# Patient Record
Sex: MALE | Race: WHITE | NOT HISPANIC OR LATINO | Employment: UNEMPLOYED | URBAN - NONMETROPOLITAN AREA
[De-identification: names, ages, dates, MRNs, and addresses within clinical notes are randomized per-mention and may not be internally consistent; named-entity substitution may affect disease eponyms.]

---

## 2024-10-24 ENCOUNTER — OFFICE VISIT (OUTPATIENT)
Dept: FAMILY MEDICINE CLINIC | Facility: CLINIC | Age: 79
End: 2024-10-24
Payer: MEDICARE

## 2024-10-24 VITALS
TEMPERATURE: 97.6 F | HEIGHT: 67 IN | OXYGEN SATURATION: 99 % | HEART RATE: 105 BPM | DIASTOLIC BLOOD PRESSURE: 70 MMHG | WEIGHT: 151 LBS | RESPIRATION RATE: 14 BRPM | BODY MASS INDEX: 23.7 KG/M2 | SYSTOLIC BLOOD PRESSURE: 130 MMHG

## 2024-10-24 DIAGNOSIS — E03.9 HYPOTHYROIDISM, UNSPECIFIED TYPE: Chronic | ICD-10-CM

## 2024-10-24 DIAGNOSIS — R07.9 CHEST PAIN, UNSPECIFIED TYPE: Primary | ICD-10-CM

## 2024-10-24 DIAGNOSIS — E55.9 VITAMIN D DEFICIENCY: ICD-10-CM

## 2024-10-24 DIAGNOSIS — I63.50 CEREBRAL INFARCTION DUE TO CEREBRAL ARTERY OCCLUSION: Chronic | ICD-10-CM

## 2024-10-24 DIAGNOSIS — I48.0 PAROXYSMAL ATRIAL FIBRILLATION: Chronic | ICD-10-CM

## 2024-10-24 DIAGNOSIS — Z95.0 PACEMAKER: Chronic | ICD-10-CM

## 2024-10-24 DIAGNOSIS — R73.03 PREDIABETES: Chronic | ICD-10-CM

## 2024-10-24 DIAGNOSIS — Z23 ENCOUNTER FOR IMMUNIZATION: ICD-10-CM

## 2024-10-24 DIAGNOSIS — E78.5 HYPERLIPIDEMIA, UNSPECIFIED HYPERLIPIDEMIA TYPE: Chronic | ICD-10-CM

## 2024-10-24 DIAGNOSIS — M79.671 RIGHT FOOT PAIN: Chronic | ICD-10-CM

## 2024-10-24 PROCEDURE — G0008 ADMIN INFLUENZA VIRUS VAC: HCPCS | Performed by: NURSE PRACTITIONER

## 2024-10-24 PROCEDURE — 1159F MED LIST DOCD IN RCRD: CPT | Performed by: NURSE PRACTITIONER

## 2024-10-24 PROCEDURE — 99204 OFFICE O/P NEW MOD 45 MIN: CPT | Performed by: NURSE PRACTITIONER

## 2024-10-24 PROCEDURE — 90662 IIV NO PRSV INCREASED AG IM: CPT | Performed by: NURSE PRACTITIONER

## 2024-10-24 PROCEDURE — 1160F RVW MEDS BY RX/DR IN RCRD: CPT | Performed by: NURSE PRACTITIONER

## 2024-10-24 RX ORDER — LEVOTHYROXINE SODIUM 50 UG/1
50 TABLET ORAL EVERY MORNING
COMMUNITY
Start: 2024-08-27

## 2024-10-24 RX ORDER — ATORVASTATIN CALCIUM 40 MG/1
40 TABLET, FILM COATED ORAL EVERY MORNING
COMMUNITY

## 2024-10-24 RX ORDER — NITROGLYCERIN 0.3 MG/1
0.3 TABLET SUBLINGUAL
Qty: 20 TABLET | Refills: 6 | Status: SHIPPED | OUTPATIENT
Start: 2024-10-24

## 2024-10-24 RX ORDER — ERGOCALCIFEROL 1.25 MG/1
1 CAPSULE, LIQUID FILLED ORAL WEEKLY
COMMUNITY
Start: 2024-08-28

## 2024-10-24 RX ORDER — SENNOSIDES 8.6 MG
1300 CAPSULE ORAL EVERY 8 HOURS PRN
COMMUNITY
Start: 2024-08-28

## 2024-10-24 RX ORDER — PANTOPRAZOLE SODIUM 40 MG/1
40 TABLET, DELAYED RELEASE ORAL EVERY MORNING
COMMUNITY

## 2024-10-24 RX ORDER — CLOPIDOGREL BISULFATE 75 MG/1
75 TABLET ORAL EVERY MORNING
COMMUNITY

## 2024-10-24 NOTE — PROGRESS NOTES
"      History of Present Illness      Twan Mcwilliams is a 79 y.o. male who presents to [facility] pertaining to his [facility] ED visit on October 23rd 2024 for chest pain.  His wife is with him today who assists with his history.  Jaime had a CVA years ago which has left him with expressive aphasia and right-sided weakness.    Angi, Jaime's wife, reports yesterday at approximately 3:00 Jaime was complaining of left chest pain.  The pain was located where his first pacemaker was located.  The pain did not radiate, he had no nausea,vomiting, or sweating.  She did drive him to the [facility] ED which is within minutes of their home and there he was evaluated.  His heart score results was a total of 5 with which was moderate. Trop: was less than 0.300.  Chest x-ray was unremarkable.The ED provider advised him to be hospitalized but Jaime adamantly refused.  Since his discharge from the emergency department, Jaime has had no further symptoms and is in his normal state of health at this time.    Jaime has been diagnosed with HTN, PVD s/p right carotid stent in 2022, paroxysmal atrial fibrillation, GI bleeding, s/p pacemaker .He had an ischemic stroke in 2013, resulting in right side deficit. 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 He underwent right carotid stenting in 2022, which was complicated by GI bleeding. Jaime was on warfarin before this, but it was switched to asa and plavix. Since then, he has been off AC and has been on asa and plavix. No further bleeding issues. He can walk with a cane.   He was seen by Dr. Rodgers, Cardiologist,  for consideration of watchman but decided to not pursue. He is \"scared\" to have any surgeries.     The following portions of the patient's history were reviewed and updated as appropriate: allergies, current medications, past family history, past medical history, past social history, past surgical history and problem list.    Review of " "Systems   Constitutional:  Negative for activity change, appetite change, chills, fatigue and fever.   HENT:  Negative for congestion and sore throat.    Eyes:  Negative for pain, discharge, redness and itching.   Respiratory:  Negative for cough, chest tightness, shortness of breath and wheezing.    Cardiovascular:  Negative for chest pain (Resolved) and palpitations.   Gastrointestinal:  Negative for nausea and vomiting.   Musculoskeletal:  Positive for arthralgias and gait problem.   Skin:  Negative for color change and rash.   Neurological:  Negative for dizziness, syncope, light-headedness and headaches.   Hematological:  Negative for adenopathy.   Psychiatric/Behavioral:  Negative for confusion. The patient is not nervous/anxious.      Vital signs:  /70 (BP Location: Right arm, Patient Position: Sitting, Cuff Size: Adult)   Pulse 105   Temp 97.6 °F (36.4 °C) (Temporal)   Resp 14   Ht 170.2 cm (67\")   Wt 68.5 kg (151 lb)   SpO2 99%   BMI 23.65 kg/m²     Physical Exam  Vitals and nursing note reviewed.   Constitutional:       General: He is not in acute distress.     Appearance: He is well-developed.   HENT:      Head: Normocephalic.      Nose: Nose normal.      Mouth/Throat:      Pharynx: No oropharyngeal exudate.   Eyes:      General:         Right eye: No discharge.         Left eye: No discharge.      Conjunctiva/sclera: Conjunctivae normal.   Neck:      Trachea: No tracheal tenderness.   Cardiovascular:      Rate and Rhythm: Normal rate and regular rhythm.      Heart sounds: Normal heart sounds. No murmur heard.     No friction rub.   Pulmonary:      Effort: Pulmonary effort is normal. No respiratory distress.      Breath sounds: Normal breath sounds. No wheezing or rales.   Musculoskeletal:         General: No tenderness.      Cervical back: Neck supple.      Right foot: Decreased range of motion. Foot drop present.   Feet:      Right foot:      Skin integrity: No ulcer, blister, skin " breakdown, erythema, warmth or fissure.      Toenail Condition: Right toenails are normal.   Skin:     General: Skin is warm and dry.      Capillary Refill: Capillary refill takes less than 2 seconds.      Findings: No erythema or rash.   Neurological:      Mental Status: He is alert and oriented to person, place, and time.      Cranial Nerves: Cranial nerve deficit present.      Motor: Weakness present.      Gait: Gait abnormal.      Comments: Right-sided weakness secondary to CVA.  He is having difficulty with his right foot which he does have a brace but is not helping in fact he reports it is hurting his foot.  Referral to podiatry placed   Psychiatric:         Mood and Affect: Mood and affect normal.         Behavior: Behavior is cooperative.         Thought Content: Thought content normal.      Comments: Communicates with difficulty.  Uses expressions and body language.     Result Review : [facility] ED records from 10/23/2024 reviewed and scanned into the chart.  Have requested his medical records from his previous PCP    BMI is within normal parameters. No other follow-up for BMI required.    Assessment & Plan     Diagnoses and all orders for this visit:    1. Chest pain, unspecified type (Primary)  Comments:  Resolved.  Prescription for nitroglycerin with instructions provided.  Referral to cardiology for further evaluation  Orders:  -     nitroglycerin (Nitrostat) 0.3 MG SL tablet; Place 1 tablet under the tongue Every 5 (Five) Minutes As Needed for Chest Pain. Take no more than 3 doses in 15 minutes.  Dispense: 20 tablet; Refill: 6  -     Ambulatory Referral to Cardiology  -     CBC & Differential; Future  -     Magnesium; Future    2. Cerebral infarction due to cerebral artery occlusion  Comments:  Stable.  Does have right-sided weakness of upper and lower extremities.  Expressive aphasia.  Orders:  -     Ambulatory Referral to Podiatry  -     CBC & Differential; Future  -     Magnesium;  Future    3. Paroxysmal atrial fibrillation  Comments:  Continue clopidogrel and aspirin  Orders:  -     CBC & Differential; Future  -     Magnesium; Future    4. Pacemaker  Comments:  Last interrogation per EP cardiologist was June 2024 and was told he would need a new battery in approximately 1 year  Orders:  -     Ambulatory Referral to Cardiology    5. Hyperlipidemia, unspecified hyperlipidemia type  Comments:  Continue atorvastatin  Orders:  -     Comprehensive Metabolic Panel; Future  -     Lipid Panel; Future    6. Hypothyroidism, unspecified type  Comments:  Continue levothyroxine  Orders:  -     TSH; Future  -     T4, Free; Future    7. Prediabetes  Comments:  Will order labs to assess  Orders:  -     Comprehensive Metabolic Panel; Future  -     Lipid Panel; Future  -     Hemoglobin A1c; Future  -     Vitamin B12; Future    8. Right foot pain  Comments:  Referral to podiatry for evaluation.  Orders:  -     Ambulatory Referral to Podiatry    9. Encounter for immunization  Comments:  Influenza vaccination given today  Orders:  -     Fluzone High-Dose 65+yrs ([id number])    10. Vitamin D deficiency  -     Vitamin D,25-Hydroxy; Future      Follow Up In November  Findings and recommendations discussed with Twan. Reviewed [facility] ED records and results. Lifestyle modifications reinforced including nutrition and activity recommendations.  Jaime will follow up in November sooner if problems/concerns occur.  Twan was given instructions and counseling regarding his condition or for health maintenance advice. Please see specific information pulled into the AVS if appropriate.    This document has been electronically signed by:

## 2024-10-26 PROBLEM — K21.9 GASTROESOPHAGEAL REFLUX DISEASE WITHOUT ESOPHAGITIS: Status: ACTIVE | Noted: 2024-10-26

## 2024-10-26 PROBLEM — T82.898A CAROTID STENT OCCLUSION: Status: RESOLVED | Noted: 2024-10-26 | Resolved: 2024-10-26

## 2024-10-26 PROBLEM — I77.9 BILATERAL CAROTID ARTERY DISEASE: Status: ACTIVE | Noted: 2024-10-26

## 2024-10-26 PROBLEM — R73.03 PREDIABETES: Status: ACTIVE | Noted: 2024-10-26

## 2024-10-26 PROBLEM — T82.898A CAROTID STENT OCCLUSION: Status: ACTIVE | Noted: 2024-10-26

## 2024-10-26 PROBLEM — E03.9 HYPOTHYROIDISM: Status: ACTIVE | Noted: 2024-10-26

## 2024-10-26 PROBLEM — I42.9 SECONDARY CARDIOMYOPATHY: Status: ACTIVE | Noted: 2024-10-26

## 2024-10-26 PROBLEM — I47.19 ATRIAL TACHYCARDIA: Status: ACTIVE | Noted: 2024-10-26

## 2024-10-26 PROBLEM — I48.0 PAROXYSMAL ATRIAL FIBRILLATION: Status: ACTIVE | Noted: 2024-10-26

## 2024-10-26 PROBLEM — E55.9 VITAMIN D DEFICIENCY: Status: ACTIVE | Noted: 2024-10-26

## 2024-10-26 PROBLEM — Z95.828 INTERNAL CAROTID ARTERY STENT PRESENT: Status: ACTIVE | Noted: 2024-10-26

## 2024-11-01 ENCOUNTER — OFFICE VISIT (OUTPATIENT)
Dept: CARDIOLOGY | Facility: CLINIC | Age: 79
End: 2024-11-01
Payer: MEDICARE

## 2024-11-01 VITALS
BODY MASS INDEX: 23.65 KG/M2 | HEART RATE: 65 BPM | HEIGHT: 67 IN | DIASTOLIC BLOOD PRESSURE: 68 MMHG | OXYGEN SATURATION: 97 % | RESPIRATION RATE: 16 BRPM | SYSTOLIC BLOOD PRESSURE: 122 MMHG

## 2024-11-01 DIAGNOSIS — R07.89 CHEST PAIN, ATYPICAL: ICD-10-CM

## 2024-11-01 DIAGNOSIS — I77.9 BILATERAL CAROTID ARTERY DISEASE, UNSPECIFIED TYPE: ICD-10-CM

## 2024-11-01 DIAGNOSIS — I48.0 PAROXYSMAL ATRIAL FIBRILLATION: Primary | ICD-10-CM

## 2024-11-01 DIAGNOSIS — Z95.0 PACEMAKER: ICD-10-CM

## 2024-11-01 DIAGNOSIS — E78.5 HYPERLIPIDEMIA, UNSPECIFIED HYPERLIPIDEMIA TYPE: ICD-10-CM

## 2024-11-01 RX ORDER — ASPIRIN 81 MG/1
81 TABLET ORAL DAILY
COMMUNITY

## 2024-11-01 RX ORDER — AMLODIPINE BESYLATE 2.5 MG/1
2.5 TABLET ORAL DAILY
Qty: 90 TABLET | Refills: 3 | Status: SHIPPED | OUTPATIENT
Start: 2024-11-01

## 2024-11-01 NOTE — PROGRESS NOTES
Lesli Blanca, KERRIE  No ref. provider found    Twan Mcwilliams  1945 11/01/2024    Subjective     Twan Mcwilliams is a 79 y.o. male who presents today to [facility] for Establish Care (Transfer from Dr. Blakely), Med Management (List from phone), Coronary Artery Disease (MI hx), and Palpitations (PPI 03/2017, [employer] device).    History of Present Illness:  81-year-old male with history of paroxysmal atrial fibrillation not on anticoagulation due to history of GI bleeding, status post pacemaker, peripheral vascular disease with right carotid stenting in 2022, comes in today to establish care.    As per chart review patient had an ischemic stroke in 2013 leading to a right side defect.  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  He underwent stenting of the right carotid artery in 2022 which was complicated by GI bleeding leading to a prolonged hospitalization of around 65 days.  Patient was previously on warfarin but was switched to aspirin and Plavix given the gastrointestinal bleeding.  He has not had any further bleeding.  Given this episode of bleeding he was found not to be a candidate for systemic anticoagulation and was offered Watchman device.  But patient does not want to pursue any invasive procedures at this point and just wants medical management.  He is pacemaker dependent and when he got his device checked on June 13, 2024 there was 1 year battery left.     Patient reports recently about 1 week ago he had an episode of chest pain for which he went to the emergency room room in [place].  Per report all the blood work and the imaging results were unremarkable patient was offered to stay overnight for monitoring but he wanted to be discharged.  Ambulates with a cane and is able to walk about 100 feet there is no new worsening exertional chest pain or shortness of breath. .  Just had this 1 episode while he was  sitting in his chair about 1 week ago for which she went to the ED.  Denies any episodes of significant palpitations dizziness lightheadedness or episodes of syncope.      No Known Allergies:    Current Outpatient Medications:     acetaminophen (TYLENOL) 650 MG 8 hr tablet, Take 2 tablets by mouth Every 8 (Eight) Hours As Needed., Disp: , Rfl:     aspirin 81 MG EC tablet, Take 1 tablet by mouth Daily., Disp: , Rfl:     atorvastatin (LIPITOR) 40 MG tablet, Take 1 tablet by mouth Every Morning., Disp: , Rfl:     clopidogrel (PLAVIX) 75 MG tablet, Take 1 tablet by mouth Every Morning., Disp: , Rfl:     levothyroxine (SYNTHROID, LEVOTHROID) 50 MCG tablet, Take 1 tablet by mouth Every Morning., Disp: , Rfl:     nitroglycerin (Nitrostat) 0.3 MG SL tablet, Place 1 tablet under the tongue Every 5 (Five) Minutes As Needed for Chest Pain. Take no more than 3 doses in 15 minutes., Disp: 20 tablet, Rfl: 6    pantoprazole (PROTONIX) 40 MG EC tablet, Take 1 tablet by mouth Every Morning., Disp: , Rfl:     vitamin D (ERGOCALCIFEROL) 1.25 MG (69149 UT) capsule capsule, Take 1 capsule by mouth 1 (One) Time Per Week., Disp: , Rfl:     amLODIPine (NORVASC) 2.5 MG tablet, Take 1 tablet by mouth Daily., Disp: 90 tablet, Rfl: 3    Past Medical History:   Diagnosis Date    Coronary artery disease     H/O: GI bleed     History of heart artery stent     Hyperlipidemia     Myocardial infarction     Pacemaker     Stroke      Past Surgical History:   Procedure Laterality Date    CORONARY STENT PLACEMENT      PACEMAKER IMPLANTATION       History reviewed. No pertinent family history.  Social History     Tobacco Use    Smoking status: Former     Current packs/day: 0.00     Average packs/day: 1 pack/day for 52.0 years (52.0 ttl pk-yrs)     Types: Cigarettes     Start date:      Quit date: 2013     Years since quittin.8     Passive exposure: Never    Smokeless tobacco: Never   Vaping Use    Vaping status: Never Used   Substance Use Topics  "   Alcohol use: Never    Drug use: Never       Objective   Blood pressure 122/68, pulse 65, resp. rate 16, height 170.2 cm (67\"), SpO2 97%.  Body mass index is 23.65 kg/m².    Constitutional:       Appearance: Not in distress.   Pulmonary:      Breath sounds: Normal breath sounds.   Cardiovascular:      Normal rate. Regular rhythm. Normal S1. Normal S2.       Murmurs: There is no murmur.   Edema:     Peripheral edema absent.   Skin:     General: Skin is warm.           DATA:           No results found for: \"BNP\"  No results found for: \"PSA\"   No results found for: \"MG\"  No results found for: \"INR\"  No results found for: \"CKTOTAL\"  No results found for: \"CHOL\", \"CHLPL\"  No results found for: \"TRIG\"  No results found for: \"HDL\"  No results found for: \"LDL\", \"LDLDIRECT\"  No components found for: \"A1C\"      No results found for: \"TSH\"          Invalid input(s): \"LABALBU\", \"PROT\"        Results review: During today's encounter, all relevant clinical data has been reviewed.        ECG 12 Lead    Date/Time: 2024 12:40 PM  Performed by: Mariana Garcia MD    Authorized by: Mariana Garcia MD  Rhythm: paced  QRS axis: normal  Other findings: non-specific ST-T wave changes  Pacin% capture  Clinical impression: abnormal EKG          Assessment & Plan    Diagnosis Plan   1. Paroxysmal atrial fibrillation        2. Pacemaker        3. Hyperlipidemia, unspecified hyperlipidemia type        4. Bilateral carotid artery disease, unspecified type        5. Chest pain, atypical  Adult Transthoracic Echo Complete W/ Cont if Necessary Per Protocol    amLODIPine (NORVASC) 2.5 MG tablet          Recommendations  Orders Placed This Encounter   Procedures    Adult Transthoracic Echo Complete W/ Cont if Necessary Per Protocol        For his history of atrial fibrillation with [id number] score of 6, age, hypertension, peripheral vascular disease and prior stroke.  Patient is currently only on DAPT with aspirin and Plavix.  " Patient and wife understand that DAPT do not negate the risk for stroke in setting of atrial fibrillation.  He has been previously deemed at high risk of bleeding so is not a candidate for anticoagulation.  Patient was again offered left atrial appendage occluder device like Watchman but him and his wife declined pursuing any procedures at this time.   For his history of carotid artery stenosis, we will continue him on DAPT and Lipitor.  Goal of LDL for him is less than 55.  He recently saw his PCP and his labs were ordered.   For his history of potential sick sinus syndrome, he has a permanent pacemaker.  ECG in today's visit showing a paced rhythm.  No evidence of under sensing or pacemaker malfunction based on the twelve-lead EKG. will schedule patient for a pacemaker interrogation as well.  As per the note from the previous cardiologist, he had a battery life of about 1 year left until June 2025.   Given his episode of chest pain and history of significant peripheral vascular disease and stroke.  Patient is at very high risk of having obstructive coronary artery disease.  Offered the patient and wife ischemic evaluation with a stress test but the do not want to get any procedures done at this time.  They are agreeable to get an echocardiogram.  Will get an echocardiogram at this time for further evaluation of the episode of chest pain.  Will continue medical management of stable coronary artery disease at this time.         New Medications:   New Medications Ordered This Visit   Medications    amLODIPine (NORVASC) 2.5 MG tablet     Sig: Take 1 tablet by mouth Daily.     Dispense:  90 tablet     Refill:  3       Discontinued Medications:   There are no discontinued medications.     Return in about 6 weeks (around 12/13/2024).      Thank you for allowing me to participate in the care of Twan Mcwilliams. Feel free to contact me directly with any further questions or concerns.      This document has been  electronically signed by Mariana Garcia MD   November 1, 2024 11:30 EDT    Dictated Utilizing Dragon Dictation: Part of this note may be an electronic transcription/translation of spoken language to printed text using the Dragon Dictation System.

## 2024-11-05 ENCOUNTER — LAB (OUTPATIENT)
Dept: FAMILY MEDICINE CLINIC | Facility: CLINIC | Age: 79
End: 2024-11-05
Payer: MEDICARE

## 2024-11-05 DIAGNOSIS — R73.03 PREDIABETES: Chronic | ICD-10-CM

## 2024-11-05 DIAGNOSIS — I63.50 CEREBRAL INFARCTION DUE TO CEREBRAL ARTERY OCCLUSION: ICD-10-CM

## 2024-11-05 DIAGNOSIS — E03.9 HYPOTHYROIDISM, UNSPECIFIED TYPE: Chronic | ICD-10-CM

## 2024-11-05 DIAGNOSIS — R07.9 CHEST PAIN, UNSPECIFIED TYPE: ICD-10-CM

## 2024-11-05 DIAGNOSIS — E55.9 VITAMIN D DEFICIENCY: ICD-10-CM

## 2024-11-05 DIAGNOSIS — I48.0 PAROXYSMAL ATRIAL FIBRILLATION: ICD-10-CM

## 2024-11-05 DIAGNOSIS — E78.5 HYPERLIPIDEMIA, UNSPECIFIED HYPERLIPIDEMIA TYPE: Chronic | ICD-10-CM

## 2024-11-05 LAB
25(OH)D3 SERPL-MCNC: 52.4 NG/ML (ref 30–100)
ALBUMIN SERPL-MCNC: 4.4 G/DL (ref 3.5–5.2)
ALBUMIN/GLOB SERPL: 1.2 G/DL
ALP SERPL-CCNC: 141 U/L (ref 39–117)
ALT SERPL W P-5'-P-CCNC: 17 U/L (ref 1–41)
ANION GAP SERPL CALCULATED.3IONS-SCNC: 12.8 MMOL/L (ref 5–15)
AST SERPL-CCNC: 23 U/L (ref 1–40)
BASOPHILS # BLD AUTO: 0.04 10*3/MM3 (ref 0–0.2)
BASOPHILS NFR BLD AUTO: 0.6 % (ref 0–1.5)
BILIRUB SERPL-MCNC: 0.5 MG/DL (ref 0–1.2)
BUN SERPL-MCNC: 18 MG/DL (ref 8–23)
BUN/CREAT SERPL: 15.4 (ref 7–25)
CALCIUM SPEC-SCNC: 9.8 MG/DL (ref 8.6–10.5)
CHLORIDE SERPL-SCNC: 106 MMOL/L (ref 98–107)
CHOLEST SERPL-MCNC: 139 MG/DL (ref 0–200)
CO2 SERPL-SCNC: 24.2 MMOL/L (ref 22–29)
CREAT SERPL-MCNC: 1.17 MG/DL (ref 0.76–1.27)
DEPRECATED RDW RBC AUTO: 45.2 FL (ref 37–54)
EGFRCR SERPLBLD CKD-EPI 2021: 63.8 ML/MIN/1.73
EOSINOPHIL # BLD AUTO: 0.32 10*3/MM3 (ref 0–0.4)
EOSINOPHIL NFR BLD AUTO: 5 % (ref 0.3–6.2)
ERYTHROCYTE [DISTWIDTH] IN BLOOD BY AUTOMATED COUNT: 13.6 % (ref 12.3–15.4)
GLOBULIN UR ELPH-MCNC: 3.6 GM/DL
GLUCOSE SERPL-MCNC: 67 MG/DL (ref 65–99)
HBA1C MFR BLD: 6.3 % (ref 4.8–5.6)
HCT VFR BLD AUTO: 41.7 % (ref 37.5–51)
HDLC SERPL-MCNC: 51 MG/DL (ref 40–60)
HGB BLD-MCNC: 13.1 G/DL (ref 13–17.7)
IMM GRANULOCYTES # BLD AUTO: 0.04 10*3/MM3 (ref 0–0.05)
IMM GRANULOCYTES NFR BLD AUTO: 0.6 % (ref 0–0.5)
LDLC SERPL CALC-MCNC: 74 MG/DL (ref 0–100)
LDLC/HDLC SERPL: 1.45 {RATIO}
LYMPHOCYTES # BLD AUTO: 1.32 10*3/MM3 (ref 0.7–3.1)
LYMPHOCYTES NFR BLD AUTO: 20.6 % (ref 19.6–45.3)
MAGNESIUM SERPL-MCNC: 2.4 MG/DL (ref 1.6–2.4)
MCH RBC QN AUTO: 28.4 PG (ref 26.6–33)
MCHC RBC AUTO-ENTMCNC: 31.4 G/DL (ref 31.5–35.7)
MCV RBC AUTO: 90.5 FL (ref 79–97)
MONOCYTES # BLD AUTO: 0.85 10*3/MM3 (ref 0.1–0.9)
MONOCYTES NFR BLD AUTO: 13.2 % (ref 5–12)
NEUTROPHILS NFR BLD AUTO: 3.85 10*3/MM3 (ref 1.7–7)
NEUTROPHILS NFR BLD AUTO: 60 % (ref 42.7–76)
NRBC BLD AUTO-RTO: 0 /100 WBC (ref 0–0.2)
PLATELET # BLD AUTO: 208 10*3/MM3 (ref 140–450)
PMV BLD AUTO: 10.9 FL (ref 6–12)
POTASSIUM SERPL-SCNC: 3.9 MMOL/L (ref 3.5–5.2)
PROT SERPL-MCNC: 8 G/DL (ref 6–8.5)
RBC # BLD AUTO: 4.61 10*6/MM3 (ref 4.14–5.8)
SODIUM SERPL-SCNC: 143 MMOL/L (ref 136–145)
T4 FREE SERPL-MCNC: 1.89 NG/DL (ref 0.92–1.68)
TRIGL SERPL-MCNC: 70 MG/DL (ref 0–150)
TSH SERPL DL<=0.05 MIU/L-ACNC: 4.57 UIU/ML (ref 0.27–4.2)
VIT B12 BLD-MCNC: 380 PG/ML (ref 211–946)
VLDLC SERPL-MCNC: 14 MG/DL (ref 5–40)
WBC NRBC COR # BLD AUTO: 6.42 10*3/MM3 (ref 3.4–10.8)

## 2024-11-05 PROCEDURE — 36415 COLL VENOUS BLD VENIPUNCTURE: CPT

## 2024-11-05 PROCEDURE — 82607 VITAMIN B-12: CPT | Performed by: NURSE PRACTITIONER

## 2024-11-05 PROCEDURE — 80053 COMPREHEN METABOLIC PANEL: CPT | Performed by: NURSE PRACTITIONER

## 2024-11-05 PROCEDURE — 82306 VITAMIN D 25 HYDROXY: CPT | Performed by: NURSE PRACTITIONER

## 2024-11-05 PROCEDURE — 84443 ASSAY THYROID STIM HORMONE: CPT | Performed by: NURSE PRACTITIONER

## 2024-11-05 PROCEDURE — 83036 HEMOGLOBIN GLYCOSYLATED A1C: CPT | Performed by: NURSE PRACTITIONER

## 2024-11-05 PROCEDURE — 80061 LIPID PANEL: CPT | Performed by: NURSE PRACTITIONER

## 2024-11-05 PROCEDURE — 84439 ASSAY OF FREE THYROXINE: CPT | Performed by: NURSE PRACTITIONER

## 2024-11-05 PROCEDURE — 85025 COMPLETE CBC W/AUTO DIFF WBC: CPT | Performed by: NURSE PRACTITIONER

## 2024-11-05 PROCEDURE — 83735 ASSAY OF MAGNESIUM: CPT | Performed by: NURSE PRACTITIONER

## 2024-11-14 ENCOUNTER — HOSPITAL ENCOUNTER (OUTPATIENT)
Facility: HOSPITAL | Age: 79
Discharge: HOME | End: 2024-11-14
Admitting: INTERNAL MEDICINE
Payer: MEDICARE

## 2024-11-14 DIAGNOSIS — R07.89 CHEST PAIN, ATYPICAL: ICD-10-CM

## 2024-11-14 PROCEDURE — 93306 TTE W/DOPPLER COMPLETE: CPT

## 2024-11-15 LAB
BH CV ECHO MEAS - AO MAX PG: 2.43 MMHG
BH CV ECHO MEAS - AO MEAN PG: 1.8 MMHG
BH CV ECHO MEAS - AO ROOT DIAM: 3.3 CM
BH CV ECHO MEAS - AO V2 MAX: 78 CM/SEC
BH CV ECHO MEAS - AO V2 VTI: 15.4 CM
BH CV ECHO MEAS - EDV(MOD-SP4): 41.1 ML
BH CV ECHO MEAS - EF(MOD-SP4): 61.3 %
BH CV ECHO MEAS - ESV(MOD-SP4): 15.9 ML
BH CV ECHO MEAS - IVS/LVPW: 0.84 CM
BH CV ECHO MEAS - IVSD: 1.41 CM
BH CV ECHO MEAS - LA DIMENSION: 2.48 CM
BH CV ECHO MEAS - LAT PEAK E' VEL: 10 CM/SEC
BH CV ECHO MEAS - LV DIASTOLIC VOL/BSA (35-75): 22.9 CM2
BH CV ECHO MEAS - LV MAX PG: 3.5 MMHG
BH CV ECHO MEAS - LV MEAN PG: 1.7 MMHG
BH CV ECHO MEAS - LV SYSTOLIC VOL/BSA (12-30): 8.9 CM2
BH CV ECHO MEAS - LV V1 MAX: 94 CM/SEC
BH CV ECHO MEAS - LV V1 VTI: 18 CM
BH CV ECHO MEAS - LVIDD: 3.3 CM
BH CV ECHO MEAS - LVIDS: 2.49 CM
BH CV ECHO MEAS - LVOT DIAM: 1.6 CM
BH CV ECHO MEAS - LVPWD: 1.67 CM
BH CV ECHO MEAS - MED PEAK E' VEL: 6.7 CM/SEC
BH CV ECHO MEAS - PA ACC TIME: 0.11 SEC
BH CV ECHO MEAS - RAP SYSTOLE: 10 MMHG
BH CV ECHO MEAS - RVSP: 23 MMHG
BH CV ECHO MEAS - SV(MOD-SP4): 25.2 ML
BH CV ECHO MEAS - SVI(MOD-SP4): 14.1 ML/M2
BH CV ECHO MEAS - TAPSE (>1.6): 2.09 CM
BH CV ECHO MEAS - TR MAX PG: 13 MMHG
BH CV ECHO MEAS - TR MAX VEL: 171.7 CM/SEC
LV EF 2D ECHO EST: 60 %

## 2024-11-20 ENCOUNTER — CLINICAL SUPPORT NO REQUIREMENTS (OUTPATIENT)
Dept: CARDIOLOGY | Facility: CLINIC | Age: 79
End: 2024-11-20
Payer: MEDICARE

## 2024-11-20 ENCOUNTER — OFFICE VISIT (OUTPATIENT)
Dept: FAMILY MEDICINE CLINIC | Facility: CLINIC | Age: 79
End: 2024-11-20
Payer: MEDICARE

## 2024-11-20 VITALS
TEMPERATURE: 96.6 F | BODY MASS INDEX: 23.7 KG/M2 | SYSTOLIC BLOOD PRESSURE: 126 MMHG | HEIGHT: 67 IN | OXYGEN SATURATION: 97 % | DIASTOLIC BLOOD PRESSURE: 62 MMHG | WEIGHT: 151 LBS | RESPIRATION RATE: 14 BRPM | HEART RATE: 83 BPM

## 2024-11-20 DIAGNOSIS — I77.9 BILATERAL CAROTID ARTERY DISEASE, UNSPECIFIED TYPE: Chronic | ICD-10-CM

## 2024-11-20 DIAGNOSIS — E03.9 HYPOTHYROIDISM, UNSPECIFIED TYPE: Primary | Chronic | ICD-10-CM

## 2024-11-20 DIAGNOSIS — R73.03 PREDIABETES: Chronic | ICD-10-CM

## 2024-11-20 DIAGNOSIS — E55.9 VITAMIN D DEFICIENCY: ICD-10-CM

## 2024-11-20 DIAGNOSIS — E78.5 HYPERLIPIDEMIA, UNSPECIFIED HYPERLIPIDEMIA TYPE: Chronic | ICD-10-CM

## 2024-11-20 DIAGNOSIS — I48.0 PAROXYSMAL ATRIAL FIBRILLATION: Chronic | ICD-10-CM

## 2024-11-20 DIAGNOSIS — K21.9 GASTROESOPHAGEAL REFLUX DISEASE WITHOUT ESOPHAGITIS: Chronic | ICD-10-CM

## 2024-11-20 DIAGNOSIS — Z95.0 CARDIAC PACEMAKER IN SITU: Primary | ICD-10-CM

## 2024-11-20 RX ORDER — PANTOPRAZOLE SODIUM 40 MG/1
40 TABLET, DELAYED RELEASE ORAL EVERY MORNING
Qty: 90 TABLET | Refills: 0 | Status: SHIPPED | OUTPATIENT
Start: 2024-11-20

## 2024-11-20 RX ORDER — LEVOTHYROXINE SODIUM 50 UG/1
50 TABLET ORAL EVERY MORNING
Qty: 90 TABLET | Refills: 0 | Status: SHIPPED | OUTPATIENT
Start: 2024-11-20

## 2024-11-20 NOTE — PROGRESS NOTES
"       History of Present Illness  Twan Mcwilliams is a 79 y.o. male who presents to [facility] pertaining to his HTN, PVD s/p right carotid stent in 2022, paroxysmal atrial fibrillation, GI bleeding, s/p pacemaker .He had an ischemic stroke in 2013, resulting in right side deficit. 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 He underwent right carotid stenting in 2022, which was complicated by GI bleeding. Jaime was on warfarin before this, but it was switched to asa and plavix. Since then, he has been prescribed asa and plavix with no further bleeding issues.He was seen by Dr. Rodgers, Cardiologist,  for consideration of watchman but decided to not pursue. He is \"scared\" to have any surgeries.    Hypertension  Risk factors for coronary artery disease include dyslipidemia and male gender. Current antihypertension treatment includes calcium channel blockers.   Lab Results   Component Value Date    GLUCOSE 67 11/05/2024    BUN 18 11/05/2024    CREATININE 1.17 11/05/2024     11/05/2024    K 3.9 11/05/2024     11/05/2024    CALCIUM 9.8 11/05/2024    PROTEINTOT 8.0 11/05/2024    ALBUMIN 4.4 11/05/2024    ALT 17 11/05/2024    AST 23 11/05/2024    ALKPHOS 141 (H) 11/05/2024    BILITOT 0.5 11/05/2024    GLOB 3.6 11/05/2024    AGRATIO 1.2 11/05/2024    BCR 15.4 11/05/2024    ANIONGAP 12.8 11/05/2024    EGFR 63.8 11/05/2024     Atrial Fibrillation  He recently underwent a cardiology evaluation with Dr. Garcia who has ordered an echo.  Past medical history includes atrial fibrillation and hyperlipidemia.  Twan does have a follow-up appointment with Dr. Dane Quevedo, vascular surgeon in December at [facility].  Lab Results   Component Value Date    TSH 4.570 (H) 11/05/2024       Hyperlipidemia  Risk factors for coronary artery disease include dyslipidemia, hypertension and male sex.   Lab Results   Component Value Date    CHOL 139 11/05/2024    TRIG 70 11/05/2024    HDL 51 " "11/05/2024    LDL 74 11/05/2024      Hypothyroidism  His past medical history is significant for atrial fibrillation and hyperlipidemia.   Lab Results   Component Value Date    TSH 4.570 (H) 11/05/2024      PreDiabetes  His past medical history is significant for atrial fibrillation and hyperlipidemia.   Lab Results   Component Value Date    HGBA1C 6.30 (H) 11/05/2024      The following portions of the patient's history   were reviewed and updated as appropriate: allergies, current medications, past family history, past medical history, past social history, past surgical history and problem list.    Review of Systems   Constitutional:  Negative for activity change, appetite change, chills, fatigue and fever.   HENT:  Negative for congestion and sore throat.    Eyes:  Negative for pain, discharge, redness and itching.   Respiratory:  Negative for cough, chest tightness, shortness of breath and wheezing.    Cardiovascular:  Negative for chest pain (Resolved) and palpitations.   Gastrointestinal:  Negative for nausea and vomiting.   Musculoskeletal:  Positive for arthralgias and gait problem.   Skin:  Negative for color change and rash.   Neurological:  Negative for dizziness, syncope, light-headedness and headaches.   Hematological:  Negative for adenopathy.   Psychiatric/Behavioral:  Negative for confusion. The patient is not nervous/anxious.      Vital signs:  /62 (BP Location: Left arm, Patient Position: Sitting, Cuff Size: Adult)   Pulse 83   Temp 96.6 °F (35.9 °C) (Temporal)   Resp 14   Ht 170.2 cm (67.01\")   Wt 68.5 kg (151 lb)   SpO2 97%   BMI 23.64 kg/m²     Physical Exam  Vitals and nursing note reviewed.   Constitutional:       General: He is not in acute distress.     Appearance: He is well-developed.   HENT:      Head: Normocephalic.      Nose: Nose normal.      Mouth/Throat:      Pharynx: No oropharyngeal exudate.   Eyes:      General:         Right eye: No discharge.         Left eye: No " discharge.      Conjunctiva/sclera: Conjunctivae normal.   Neck:      Trachea: No tracheal tenderness.   Cardiovascular:      Rate and Rhythm: Normal rate and regular rhythm.      Heart sounds: Normal heart sounds. No murmur heard.     No friction rub.   Pulmonary:      Effort: Pulmonary effort is normal. No respiratory distress.      Breath sounds: Normal breath sounds. No wheezing or rales.   Musculoskeletal:         General: No tenderness.      Cervical back: Neck supple.      Right foot: Decreased range of motion. Foot drop present.   Feet:      Right foot:      Skin integrity: No ulcer, blister, skin breakdown, erythema, warmth or fissure.      Toenail Condition: Right toenails are normal.   Skin:     General: Skin is warm and dry.      Capillary Refill: Capillary refill takes less than 2 seconds.      Findings: No erythema or rash.   Neurological:      Mental Status: He is alert and oriented to person, place, and time.      Cranial Nerves: Cranial nerve deficit present.      Motor: Weakness present.      Gait: Gait abnormal.      Comments: Right-sided weakness secondary to CVA.  He is having difficulty with his right foot which he does have a brace but is not helping in fact he reports it is hurting his foot.  Has podiatry appointment in December.   Psychiatric:         Mood and Affect: Mood and affect normal.         Behavior: Behavior is cooperative.         Thought Content: Thought content normal.      Comments: Communicates with difficulty.  Uses expressions and body language.     Result Review :  Results for orders placed or performed during the hospital encounter of 11/14/24   Adult Transthoracic Echo Complete W/ Cont if Necessary Per Protocol    Collection Time: 11/14/24  1:06 PM   Result Value Ref Range    LVIDd 3.3 cm    LVIDs 2.49 cm    IVSd 1.41 cm    LVPWd 1.67 cm    IVS/LVPW 0.84 cm    LV Sys Vol (BSA corrected) 8.9 cm2    LV Serrano Vol (BSA corrected) 22.9 cm2    LVOT diam 1.60 cm    EDV(MOD-sp4)  41.1 ml    ESV(MOD-sp4) 15.9 ml    SV(MOD-sp4) 25.2 ml    SVi(MOD-SP4) 14.1 ml/m2    EF(MOD-sp4) 61.3 %    Med Peak E' Shimon 6.7 cm/sec    Lat Peak E' Shimon 10.0 cm/sec    TR max shimon 171.7 cm/sec    TAPSE (>1.6) 2.09 cm    LA dimension (2D)  2.48 cm    LV V1 max 94.0 cm/sec    LV V1 max PG 3.5 mmHg    LV V1 mean PG 1.70 mmHg    LV V1 VTI 18.0 cm    Ao pk shimon 78.0 cm/sec    Ao max PG 2.43 mmHg    Ao mean PG 1.80 mmHg    Ao V2 VTI 15.4 cm    TR max PG 13.0 mmHg    RVSP(TR) 23.0 mmHg    RAP systole 10.0 mmHg    PA acc time 0.11 sec    Ao root diam 3.3 cm    Echo EF Estimated 60.0 %     BMI is within normal parameters. No other follow-up for BMI required.    Assessment & Plan     Diagnoses and all orders for this visit:    1. Hypothyroidism, unspecified type (Primary)  Comments:  He will continue levothyroxine 50 mcg  Orders:  -     levothyroxine (SYNTHROID, LEVOTHROID) 50 MCG tablet; Take 1 tablet by mouth Every Morning.  Dispense: 90 tablet; Refill: 0    2. Prediabetes  Comments:  Reviewed recent A1c.  Treatment options reviewed including adding a SGLT2.  Will continue lifestyle modifications.  Daily B12    3. Gastroesophageal reflux disease without esophagitis  Comments:  Stable.  Continue pantoprazole  Orders:  -     pantoprazole (PROTONIX) 40 MG EC tablet; Take 1 tablet by mouth Every Morning.  Dispense: 90 tablet; Refill: 0    4. Hyperlipidemia, unspecified hyperlipidemia type  Comments:  Reviewed recent lipid panel with LDL 74 with goal of 50. Has cardiology appt in December.    5. Paroxysmal atrial fibrillation  Comments:  Continue clopidogrel and aspirin    6. Bilateral carotid artery disease, unspecified type  Comments:  Amlodipine 2.5 mg added per cardiology    7. Vitamin D deficiency  Comments:  Daily supplement      Follow Up In December for Medicare wellness  Findings and recommendations discussed with Twan. Reviewed test results he and his wife. Lifestyle modifications reinforced including nutrition and  activity recommendations.  Twan will follow up in December for Medicare wellness visit sooner if problems/concerns occur.  Twan was given instructions and counseling regarding his condition or for health maintenance advice. Please see specific information pulled into the AVS if appropriate.      This document has been electronically signed by:

## 2024-12-12 ENCOUNTER — OFFICE VISIT (OUTPATIENT)
Dept: CARDIOLOGY | Facility: CLINIC | Age: 79
End: 2024-12-12
Payer: MEDICARE

## 2024-12-12 VITALS
DIASTOLIC BLOOD PRESSURE: 71 MMHG | HEART RATE: 63 BPM | WEIGHT: 151 LBS | OXYGEN SATURATION: 98 % | HEIGHT: 67 IN | SYSTOLIC BLOOD PRESSURE: 127 MMHG | BODY MASS INDEX: 23.7 KG/M2

## 2024-12-12 DIAGNOSIS — Z95.0 CARDIAC PACEMAKER IN SITU: Primary | ICD-10-CM

## 2024-12-12 DIAGNOSIS — I48.0 PAROXYSMAL ATRIAL FIBRILLATION: ICD-10-CM

## 2024-12-12 DIAGNOSIS — E78.5 HYPERLIPIDEMIA, UNSPECIFIED HYPERLIPIDEMIA TYPE: ICD-10-CM

## 2024-12-12 DIAGNOSIS — I77.9 BILATERAL CAROTID ARTERY DISEASE, UNSPECIFIED TYPE: ICD-10-CM

## 2024-12-12 DIAGNOSIS — I47.29 NSVT (NONSUSTAINED VENTRICULAR TACHYCARDIA): ICD-10-CM

## 2024-12-12 DIAGNOSIS — R07.89 CHEST PAIN, ATYPICAL: ICD-10-CM

## 2024-12-12 RX ORDER — ATORVASTATIN CALCIUM 80 MG/1
80 TABLET, FILM COATED ORAL DAILY
Qty: 90 TABLET | Refills: 3 | Status: SHIPPED | OUTPATIENT
Start: 2024-12-12

## 2024-12-12 RX ORDER — ASPIRIN 81 MG/1
81 TABLET ORAL DAILY
Qty: 90 TABLET | Refills: 3 | Status: SHIPPED | OUTPATIENT
Start: 2024-12-12

## 2024-12-12 RX ORDER — METOPROLOL SUCCINATE 50 MG/1
50 TABLET, EXTENDED RELEASE ORAL DAILY
Qty: 90 TABLET | Refills: 3 | Status: SHIPPED | OUTPATIENT
Start: 2024-12-12

## 2024-12-12 RX ORDER — CLOPIDOGREL BISULFATE 75 MG/1
75 TABLET ORAL EVERY MORNING
Qty: 90 TABLET | Refills: 3 | Status: SHIPPED | OUTPATIENT
Start: 2024-12-12

## 2024-12-12 RX ORDER — AMLODIPINE BESYLATE 2.5 MG/1
2.5 TABLET ORAL DAILY
Qty: 90 TABLET | Refills: 3 | Status: SHIPPED | OUTPATIENT
Start: 2024-12-12

## 2024-12-12 NOTE — PROGRESS NOTES
Lesli Blanca, KERRIE  No ref. provider found    Twan Mcwilliams  1945 11/01/2024    Subjective     Twan Mcwilliams is a 79 y.o. male who presents today to [facility] CARDIOLOGY for Follow-up (6 Week follow up /) and Med Management (Verbal. ).    History of Present Illness:  81-year-old male with history of paroxysmal atrial fibrillation not on anticoagulation due to history of GI bleeding, status post pacemaker, peripheral vascular disease with right carotid stenting in 2022, comes in today to establish care.    As per chart review patient had an ischemic stroke in 2013 leading to a right side defect.  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  He underwent stenting of the right carotid artery in 2022 which was complicated by GI bleeding leading to a prolonged hospitalization of around 65 days.  Patient was previously on warfarin but was switched to aspirin and Plavix given the gastrointestinal bleeding.  He has not had any further bleeding.  Given this episode of bleeding he was found not to be a candidate for systemic anticoagulation and was offered Watchman device.  But patient does not want to pursue any invasive procedures at this point and just wants medical management.  He is pacemaker dependent and when he got his device checked on June 13, 2024 there was 1 year battery left.     Patient reports recently about 1 week ago he had an episode of chest pain for which he went to the emergency room room in [place].  Per report all the blood work and the imaging results were unremarkable patient was offered to stay overnight for monitoring but he wanted to be discharged.  Ambulates with a cane and is able to walk about 100 feet there is no new worsening exertional chest pain or shortness of breath. .  Just had this 1 episode while he was sitting in his chair about 1 week ago for which she went to the ED.  Denies any episodes of significant  palpitations dizziness lightheadedness or episodes of syncope.    Today's visit 12/12/2024:  Patient is accompanied with his wife.  Overall patient reports that he has not had any more episodes of chest pain.  He denies having any exertional chest pain or shortness of breath.  The pacemaker interrogation showed 1 episode of supraventricular tachycardia and 56 episodes of NSVT with longest episode lasting for 13 beats with a peak heart rate of 220 bpm.  Echocardiogram done on 11/14/2024 showing ejection fraction of 60% with no significant valvular abnormalities.  He denies noticing any palpitations dizziness lightheadedness or episodes of passing out.  Patient did have an episode of fall at home while using the restroom in which he hurt his right leg.      No Known Allergies:    Current Outpatient Medications:     acetaminophen (TYLENOL) 650 MG 8 hr tablet, Take 2 tablets by mouth Every 8 (Eight) Hours As Needed., Disp: , Rfl:     amLODIPine (NORVASC) 2.5 MG tablet, Take 1 tablet by mouth Daily., Disp: 90 tablet, Rfl: 3    aspirin 81 MG EC tablet, Take 1 tablet by mouth Daily., Disp: 90 tablet, Rfl: 3    clopidogrel (PLAVIX) 75 MG tablet, Take 1 tablet by mouth Every Morning., Disp: 90 tablet, Rfl: 3    levothyroxine (SYNTHROID, LEVOTHROID) 50 MCG tablet, Take 1 tablet by mouth Every Morning., Disp: 90 tablet, Rfl: 0    nitroglycerin (Nitrostat) 0.3 MG SL tablet, Place 1 tablet under the tongue Every 5 (Five) Minutes As Needed for Chest Pain. Take no more than 3 doses in 15 minutes., Disp: 20 tablet, Rfl: 6    pantoprazole (PROTONIX) 40 MG EC tablet, Take 1 tablet by mouth Every Morning., Disp: 90 tablet, Rfl: 0    vitamin D (ERGOCALCIFEROL) 1.25 MG (28170 UT) capsule capsule, Take 1 capsule by mouth 1 (One) Time Per Week., Disp: , Rfl:     atorvastatin (LIPITOR) 80 MG tablet, Take 1 tablet by mouth Daily., Disp: 90 tablet, Rfl: 3    metoprolol succinate XL (Toprol XL) 50 MG 24 hr tablet, Take 1 tablet by mouth  "Daily., Disp: 90 tablet, Rfl: 3    Past Medical History:   Diagnosis Date    Coronary artery disease     H/O: GI bleed     History of heart artery stent     Hyperlipidemia     Myocardial infarction     Pacemaker     Stroke      Past Surgical History:   Procedure Laterality Date    CORONARY STENT PLACEMENT      PACEMAKER IMPLANTATION       No family history on file.  Social History     Tobacco Use    Smoking status: Former     Current packs/day: 0.00     Average packs/day: 1 pack/day for 52.0 years (52.0 ttl pk-yrs)     Types: Cigarettes     Start date:      Quit date:      Years since quittin.9     Passive exposure: Never    Smokeless tobacco: Never   Vaping Use    Vaping status: Never Used   Substance Use Topics    Alcohol use: Never    Drug use: Never       Objective   Blood pressure 127/71, pulse 63, height 170.2 cm (67.01\"), weight 68.5 kg (151 lb), SpO2 98%.  Body mass index is 23.64 kg/m².    Constitutional:       Appearance: Not in distress.   Pulmonary:      Breath sounds: Normal breath sounds.   Cardiovascular:      Normal rate. Regular rhythm. Normal S1. Normal S2.       Murmurs: There is no murmur.   Edema:     Peripheral edema absent.   Skin:     General: Skin is warm.           DATA:   Results for orders placed during the hospital encounter of 24    Adult Transthoracic Echo Complete W/ Cont if Necessary Per Protocol    Interpretation Summary    Left ventricular systolic function is normal. Estimated left ventricular EF = 60%    The left ventricular cavity is small in size.    Left ventricular wall thickness is consistent with moderate concentric hypertrophy.    Left ventricular diastolic function is consistent with (grade I) impaired relaxation.    Estimated right ventricular systolic pressure from tricuspid regurgitation is normal (<35 mmHg).    Normal right ventricular cavity size and systolic function noted.    No significant valvular abnormalities are seen    There is no evidence " "of pericardial effusion.    No prior studies available for comparison          No results found for: \"BNP\"  No results found for: \"PSA\"   Lab Results   Component Value Date    MG 2.4 11/05/2024     No results found for: \"INR\"  No results found for: \"CKTOTAL\"  Lab Results   Component Value Date    CHOL 139 11/05/2024     Lab Results   Component Value Date    TRIG 70 11/05/2024     Lab Results   Component Value Date    HDL 51 11/05/2024     Lab Results   Component Value Date    LDL 74 11/05/2024     No components found for: \"A1C\"      Lab Results   Component Value Date    TSH 4.570 (H) 11/05/2024             Invalid input(s): \"LABALBU\", \"PROT\"        Results review: During today's encounter, all relevant clinical data has been reviewed.      Procedures    Assessment & Plan    Diagnosis Plan   1. Cardiac pacemaker in situ  metoprolol succinate XL (Toprol XL) 50 MG 24 hr tablet    atorvastatin (LIPITOR) 80 MG tablet    amLODIPine (NORVASC) 2.5 MG tablet    aspirin 81 MG EC tablet    clopidogrel (PLAVIX) 75 MG tablet      2. Paroxysmal atrial fibrillation  metoprolol succinate XL (Toprol XL) 50 MG 24 hr tablet    atorvastatin (LIPITOR) 80 MG tablet    amLODIPine (NORVASC) 2.5 MG tablet    aspirin 81 MG EC tablet    clopidogrel (PLAVIX) 75 MG tablet      3. Hyperlipidemia, unspecified hyperlipidemia type  metoprolol succinate XL (Toprol XL) 50 MG 24 hr tablet    atorvastatin (LIPITOR) 80 MG tablet    amLODIPine (NORVASC) 2.5 MG tablet    aspirin 81 MG EC tablet    clopidogrel (PLAVIX) 75 MG tablet    Lipid Panel      4. Bilateral carotid artery disease, unspecified type  metoprolol succinate XL (Toprol XL) 50 MG 24 hr tablet    atorvastatin (LIPITOR) 80 MG tablet    amLODIPine (NORVASC) 2.5 MG tablet    aspirin 81 MG EC tablet    clopidogrel (PLAVIX) 75 MG tablet      5. Chest pain, atypical  metoprolol succinate XL (Toprol XL) 50 MG 24 hr tablet    atorvastatin (LIPITOR) 80 MG tablet    amLODIPine (NORVASC) 2.5 MG tablet "    aspirin 81 MG EC tablet    clopidogrel (PLAVIX) 75 MG tablet      6. NSVT (nonsustained ventricular tachycardia)  metoprolol succinate XL (Toprol XL) 50 MG 24 hr tablet    atorvastatin (LIPITOR) 80 MG tablet    amLODIPine (NORVASC) 2.5 MG tablet    aspirin 81 MG EC tablet    clopidogrel (PLAVIX) 75 MG tablet            Recommendations  Orders Placed This Encounter   Procedures    Lipid Panel        For his history of atrial fibrillation with [id number] score of 6, age, hypertension, peripheral vascular disease and prior stroke.  Patient is currently only on DAPT with aspirin and Plavix.  Patient and wife understand that DAPT do not negate the risk for stroke in setting of atrial fibrillation.  He has been previously deemed at high risk of bleeding due to high risk of falling and prior GI bleeding so is not a candidate for anticoagulation.   For his history of carotid artery stenosis, we will continue him on DAPT and Lipitor.  Goal of LDL for him is less than 55.  Increase the dose of the atorvastatin to 80 mg p.o. once daily and check lipid panel in 3 months.  For his history of potential sick sinus syndrome, he has a permanent pacemaker.   The pacemaker interrogation showed 1 episode of supraventricular tachycardia and 56 episodes of NSVT with longest episode lasting for 13 beats with a peak heart rate of 220 bpm.  Start patient on Toprol-XL 50 mg p.o. once daily for the episode of SVT and multiple episodes of NSVT.  The interrogation did show 1 year battery life left.  Will potentially plan for pacemaker generator change near the next visit in March 2025.  Given his episode of chest pain and history of significant peripheral vascular disease and stroke.  Patient is at very high risk of having obstructive coronary artery disease.  Previously patient and wife did not want to pursue a stress test and only agreed to echocardiogram.  Echocardiogram is showing normal ejection fraction and since patient did not have  any more episodes of chest pain .  Will continue medical management of possible coronary artery disease.  Will continue DAPT and statin at this time.       New Medications:   New Medications Ordered This Visit   Medications    metoprolol succinate XL (Toprol XL) 50 MG 24 hr tablet     Sig: Take 1 tablet by mouth Daily.     Dispense:  90 tablet     Refill:  3    atorvastatin (LIPITOR) 80 MG tablet     Sig: Take 1 tablet by mouth Daily.     Dispense:  90 tablet     Refill:  3    amLODIPine (NORVASC) 2.5 MG tablet     Sig: Take 1 tablet by mouth Daily.     Dispense:  90 tablet     Refill:  3    aspirin 81 MG EC tablet     Sig: Take 1 tablet by mouth Daily.     Dispense:  90 tablet     Refill:  3    clopidogrel (PLAVIX) 75 MG tablet     Sig: Take 1 tablet by mouth Every Morning.     Dispense:  90 tablet     Refill:  3       Discontinued Medications:   Medications Discontinued During This Encounter   Medication Reason    atorvastatin (LIPITOR) 40 MG tablet Dose adjustment    clopidogrel (PLAVIX) 75 MG tablet Reorder    aspirin 81 MG EC tablet Reorder    amLODIPine (NORVASC) 2.5 MG tablet Reorder        Return in about 3 months (around 3/12/2025).      Thank you for allowing me to participate in the care of Twan Mcwilliams. Feel free to contact me directly with any further questions or concerns.      This document has been electronically signed by Mariana Garcia MD   December 12, 2024 09:32 EST    Dictated Utilizing Dragon Dictation: Part of this note may be an electronic transcription/translation of spoken language to printed text using the Dragon Dictation System.

## 2024-12-20 ENCOUNTER — OFFICE VISIT (OUTPATIENT)
Dept: FAMILY MEDICINE CLINIC | Facility: CLINIC | Age: 79
End: 2024-12-20
Payer: MEDICARE

## 2024-12-20 VITALS
WEIGHT: 151 LBS | DIASTOLIC BLOOD PRESSURE: 70 MMHG | SYSTOLIC BLOOD PRESSURE: 130 MMHG | HEIGHT: 67 IN | RESPIRATION RATE: 14 BRPM | OXYGEN SATURATION: 98 % | BODY MASS INDEX: 23.7 KG/M2 | TEMPERATURE: 97.5 F | HEART RATE: 94 BPM

## 2024-12-20 DIAGNOSIS — E55.9 VITAMIN D DEFICIENCY: Chronic | ICD-10-CM

## 2024-12-20 DIAGNOSIS — Z00.00 MEDICARE ANNUAL WELLNESS VISIT, SUBSEQUENT: Primary | ICD-10-CM

## 2024-12-20 DIAGNOSIS — R73.03 PREDIABETES: Chronic | ICD-10-CM

## 2024-12-20 DIAGNOSIS — E03.9 HYPOTHYROIDISM, UNSPECIFIED TYPE: Chronic | ICD-10-CM

## 2024-12-20 DIAGNOSIS — M25.551 RIGHT HIP PAIN: ICD-10-CM

## 2024-12-20 DIAGNOSIS — K21.9 GASTROESOPHAGEAL REFLUX DISEASE WITHOUT ESOPHAGITIS: Chronic | ICD-10-CM

## 2024-12-20 DIAGNOSIS — I48.0 PAROXYSMAL ATRIAL FIBRILLATION: Chronic | ICD-10-CM

## 2024-12-20 DIAGNOSIS — E78.5 HYPERLIPIDEMIA, UNSPECIFIED HYPERLIPIDEMIA TYPE: Chronic | ICD-10-CM

## 2024-12-20 DIAGNOSIS — I77.9 BILATERAL CAROTID ARTERY DISEASE, UNSPECIFIED TYPE: Chronic | ICD-10-CM

## 2024-12-20 NOTE — PROGRESS NOTES
Medicare Wellness Visit    Twan Mcwilliams is a 79 y.o. patient who presents for a Medicare Wellness Visit.    The following portions of the patient's history were reviewed and   updated as appropriate: allergies, current medications, past family history, past medical history, past social history, past surgical history, and problem list.    Compared to one year ago, the patient's physical   health is the same.  Compared to one year ago, the patient's mental   health is the same.    Recent Hospitalizations:  He was not admitted to the hospital during the last year.     Current Medical Providers:  Patient Care Team:  Lesli Blanca APRN as PCP - General (Family Medicine)  Radha Christianson MD: Cardiology  Dane Quevedo MD: Vascular Surgeon     Outpatient Medications Prior to Visit   Medication Sig Dispense Refill    acetaminophen (TYLENOL) 650 MG 8 hr tablet Take 2 tablets by mouth Every 8 (Eight) Hours As Needed.      amLODIPine (NORVASC) 2.5 MG tablet Take 1 tablet by mouth Daily. 90 tablet 3    aspirin 81 MG EC tablet Take 1 tablet by mouth Daily. 90 tablet 3    atorvastatin (LIPITOR) 80 MG tablet Take 1 tablet by mouth Daily. 90 tablet 3    clopidogrel (PLAVIX) 75 MG tablet Take 1 tablet by mouth Every Morning. 90 tablet 3    levothyroxine (SYNTHROID, LEVOTHROID) 50 MCG tablet Take 1 tablet by mouth Every Morning. 90 tablet 0    metoprolol succinate XL (Toprol XL) 50 MG 24 hr tablet Take 1 tablet by mouth Daily. 90 tablet 3    nitroglycerin (Nitrostat) 0.3 MG SL tablet Place 1 tablet under the tongue Every 5 (Five) Minutes As Needed for Chest Pain. Take no more than 3 doses in 15 minutes. 20 tablet 6    pantoprazole (PROTONIX) 40 MG EC tablet Take 1 tablet by mouth Every Morning. 90 tablet 0    vitamin D (ERGOCALCIFEROL) 1.25 MG (60603 UT) capsule capsule Take 1 capsule by mouth 1 (One) Time Per Week.       No facility-administered medications prior to visit.     No opioid medication identified on active  "medication list. I have reviewed chart for other potential  high risk medication/s and harmful drug interactions in the elderly.    Aspirin is on active medication list. Aspirin use is indicated based on review of current medical condition/s. Pros and cons of this therapy have been discussed today. Benefits of this medication outweigh potential harm.  Patient has been encouraged to continue taking this medication.  .    Patient Active Problem List   Diagnosis    Paroxysmal atrial fibrillation    Cardiomyopathy, secondary    Pacemaker    Hyperlipidemia    Bilateral carotid artery disease    Prediabetes    Atrial tachycardia    Internal carotid artery stent present    Hypothyroidism    Vitamin D deficiency    Gastroesophageal reflux disease without esophagitis     Advance Directive is not on file. ACP discussion was held with the patient during this visit. Patient has an advance directive (not in EMR), copy requested.      Vitals:    12/20/24 0919   BP: 130/70   BP Location: Right arm   Patient Position: Sitting   Cuff Size: Adult   Pulse: 94   Resp: 14   Temp: 97.5 °F (36.4 °C)   TempSrc: Temporal   SpO2: 98%   Weight: 68.5 kg (151 lb)   Height: 170.2 cm (67.01\")   PainSc: 0-No pain     Estimated body mass index is 23.64 kg/m² as calculated from the following:    Height as of this encounter: 170.2 cm (67.01\").    Weight as of this encounter: 68.5 kg (151 lb).    BMI is within normal parameters. No other follow-up for BMI required.    Does the patient have evidence of cognitive impairment?  At times  Lab Results   Component Value Date    TRIG 70 11/05/2024    HDL 51 11/05/2024    LDL 74 11/05/2024    VLDL 14 11/05/2024    HGBA1C 6.30 (H) 11/05/2024                                                                                Health  Risk Assessment    Smoking Status:  Social History     Tobacco Use   Smoking Status Former    Current packs/day: 0.00    Average packs/day: 1 pack/day for 52.0 years (52.0 ttl pk-yrs)    " Types: Cigarettes    Start date:     Quit date:     Years since quittin.9    Passive exposure: Never   Smokeless Tobacco Never     Alcohol Consumption:  Social History     Substance and Sexual Activity   Alcohol Use Never     Fall Risk Screen  LIZ Fall Risk Assessment was completed, and patient is at HIGH risk for falls. Assessment completed on:2024    Depression Screening  Little interest or pleasure in doing things? Not at all   Feeling down, depressed, or hopeless? Not at all   PHQ-2 Total Score 0      Health Habits and Functional and Cognitive Screenin/20/2024     9:20 AM   Functional & Cognitive Status   Do you have difficulty preparing food and eating? Yes   Do you have difficulty bathing yourself, getting dressed or grooming yourself? Yes   Do you have difficulty using the toilet? No   Do you have difficulty moving around from place to place? Yes   Do you have trouble with steps or getting out of a bed or a chair? Yes   Current Diet Well Balanced Diet   Dental Exam Not up to date   Eye Exam Not up to date   Exercise (times per week) 0 times per week   Do you need help using the phone?  No   Are you deaf or do you have serious difficulty hearing?  No   Do you need help to go to places out of walking distance? Yes   Do you need help shopping? Yes   Do you need help preparing meals?  Yes   Do you need help with housework?  Yes   Do you need help with laundry? Yes   Do you need help taking your medications? Yes   Do you need help managing money? Yes   Do you ever drive or ride in a car without wearing a seat belt? No   Have you felt unusual stress, anger or loneliness in the last month? No   Who do you live with? Sibling   If you need help, do you have trouble finding someone available to you? No   Have you been bothered in the last four weeks by sexual problems? No   Do you have difficulty concentrating, remembering or making decisions? Yes     Age-appropriate Screening  Schedule:  Refer to the list below for future screening recommendations based on patient's age, sex and/or medical conditions. Orders for these recommended tests are listed in the plan section. The patient has been provided with a written plan.    Health Maintenance List  Health Maintenance   Topic Date Due    RSV Vaccine - Adults (1 - 1-dose 75+ series) Discussed    ZOSTER VACCINE (2 of 2) 04/25/2024    COVID-19 Vaccine (1 - 2024-25 season) Never done    HEPATITIS C SCREENING  To be scheduled    ANNUAL WELLNESS VISIT  12/20/2025    LIPID PANEL  11/05/2025    TDAP/TD VACCINES (2 - Td or Tdap) 08/02/2033    INFLUENZA VACCINE  Completed    Pneumococcal Vaccine 65+  Completed                                                                                                                                               CMS Preventative Services Quick Reference  Risk Factors Identified During Encounter  Fall Risk-High or Moderate: Information on Fall Prevention Shared in After Visit Summary  Immunizations Discussed/Encouraged: RSV (Respiratory Syncytial Virus)    The above risks/problems have been discussed with the patient.  Pertinent information has been shared with the patient in the After Visit Summary.  An After Visit Summary and PPPS were made available to the patient.    Follow Up: Next Medicare Wellness visit to be scheduled in 1 year.     BUD Trent is also being seen today for HTN, PVD s/p right carotid stent in 2022, paroxysmal atrial fibrillation, GI bleeding, s/p pacemaker .He had an ischemic stroke in 2013, resulting in right side deficit. 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 He underwent right carotid stenting in 2022, which was complicated by GI bleeding. Jaime was on warfarin before this, but it was switched to asa and plavix. Since then, he has been prescribed asa and plavix with no further bleeding issues.He was seen by Dr. Rodgers, Cardiologist,  for consideration of watchman but decided to not pursue.  "He is \"scared\" to have any surgeries.  Currently his cardiology team includes Dane Quevedo MD: Vascular surgeon and Mariana Garcia MD, cardiology.  Twan does report that he had a fall several days ago in which he landed on his right hip.  Hypertension  Risk factors for coronary artery disease include dyslipidemia and male gender. Current antihypertension treatment includes calcium channel blockers.   Lab Results   Component Value Date    GLUCOSE 67 11/05/2024    BUN 18 11/05/2024    CREATININE 1.17 11/05/2024     11/05/2024    K 3.9 11/05/2024     11/05/2024    CALCIUM 9.8 11/05/2024    PROTEINTOT 8.0 11/05/2024    ALBUMIN 4.4 11/05/2024    ALT 17 11/05/2024    AST 23 11/05/2024    ALKPHOS 141 (H) 11/05/2024    BILITOT 0.5 11/05/2024    GLOB 3.6 11/05/2024    AGRATIO 1.2 11/05/2024    BCR 15.4 11/05/2024    ANIONGAP 12.8 11/05/2024    EGFR 63.8 11/05/2024     Atrial Fibrillation  He recently underwent a cardiology evaluation with Dr. Garcia on 12/12/2024 who added metoprolol which he has not started..  Past medical history includes atrial fibrillation and hyperlipidemia.  Twan did have a follow-up appointment with Dr. Dane Quevedo, vascular surgeon in December 3, 2024 at [facility].  Lab Results   Component Value Date    TSH 4.570 (H) 11/05/2024     Hyperlipidemia  Risk factors for coronary artery disease include dyslipidemia, hypertension and male sex.   Lab Results   Component Value Date    CHOL 139 11/05/2024    TRIG 70 11/05/2024    HDL 51 11/05/2024    LDL 74 11/05/2024      Hypothyroidism  His past medical history is significant for atrial fibrillation and hyperlipidemia.   Lab Results   Component Value Date    TSH 4.570 (H) 11/05/2024      PreDiabetes  His past medical history is significant for atrial fibrillation and hyperlipidemia.   Lab Results   Component Value Date    HGBA1C 6.30 (H) 11/05/2024     Vital Signs:  /70 (BP Location: Right arm, Patient Position: Sitting, Cuff " "Size: Adult)   Pulse 94   Temp 97.5 °F (36.4 °C) (Temporal)   Resp 14   Ht 170.2 cm (67.01\")   Wt 68.5 kg (151 lb)   SpO2 98%   BMI 23.64 kg/m²   Physical Exam  Vitals and nursing note reviewed.   Constitutional:       General: He is not in acute distress.     Appearance: He is well-developed.   HENT:      Head: Normocephalic.      Nose: Nose normal.      Mouth/Throat:      Pharynx: No oropharyngeal exudate.   Eyes:      General:         Right eye: No discharge.         Left eye: No discharge.      Conjunctiva/sclera: Conjunctivae normal.   Cardiovascular:      Rate and Rhythm: Normal rate and regular rhythm.      Heart sounds: Normal heart sounds. No murmur heard.     No friction rub.   Pulmonary:      Effort: Pulmonary effort is normal. No respiratory distress.      Breath sounds: Normal breath sounds. No wheezing or rales.   Musculoskeletal:         General: Tenderness (Right hip) present.      Cervical back: Neck supple.      Left hip: Tenderness and bony tenderness present.      Right lower leg: No edema.      Left lower leg: No edema.      Right foot: Decreased range of motion. Foot drop present.   Feet:      Right foot:      Skin integrity: No ulcer, blister, skin breakdown, erythema, warmth or fissure.      Toenail Condition: Right toenails are normal.   Skin:     General: Skin is warm and dry.      Capillary Refill: Capillary refill takes less than 2 seconds.      Findings: No erythema or rash.   Neurological:      Mental Status: He is alert and oriented to person, place, and time.      Cranial Nerves: Cranial nerve deficit present.      Motor: Weakness present.      Gait: Gait abnormal.      Comments: Right-sided weakness secondary to CVA.  He is having difficulty with his right foot which he does have a brace but is not helping in fact he reports it is hurting his foot.  Had podiatry appointment in December.   Psychiatric:         Mood and Affect: Mood and affect normal.         Behavior: Behavior " is cooperative.         Thought Content: Thought content normal.      Comments: Communicates with difficulty.  Uses expressions and body language.     Cardiology and vascular surgery consult notes were reviewed.  In addition, recent vascular studies completed at [facility] and     Assessment and Plan   Diagnoses and all orders for this visit:    1. Medicare annual wellness visit, subsequent (Primary)  Comments:  Findings and recommendations discussed with him and his wife who is with him today    2. Prediabetes  Comments:  Continue to follow    3. Hypothyroidism, unspecified type  Comments:  Continue levothyroxine 50 mcg    4. Gastroesophageal reflux disease without esophagitis  Comments:  Continue pantoprazole    5. Hyperlipidemia, unspecified hyperlipidemia type  Comments:  Atorvastatin recently increased to 80 mg per cardiology noted    6. Paroxysmal atrial fibrillation  Comments:  Stable.  Metoprolol(Toprol )50 Mg recently added at cardiology  appointment    7. Bilateral carotid artery disease, unspecified type  Comments:  Continue aspirin and clopidogrel    8. Vitamin D deficiency  Comments:  Continue weekly vitamin D supplement    9. Right hip pain  Comments:  Right hip x-ray ordered  Orders:  -     XR hip w or wo pelvis 4 view right; Future    Follow Up: In February    Twan was given instructions and counseling regarding his condition or for health maintenance advice. Please see specific information pulled into the AVS if appropriate.

## 2025-02-05 ENCOUNTER — CLINICAL SUPPORT NO REQUIREMENTS (OUTPATIENT)
Dept: CARDIOLOGY | Facility: CLINIC | Age: 80
End: 2025-02-05
Payer: MEDICARE

## 2025-02-05 DIAGNOSIS — Z95.0 CARDIAC PACEMAKER IN SITU: Primary | ICD-10-CM

## 2025-02-18 ENCOUNTER — LAB (OUTPATIENT)
Dept: FAMILY MEDICINE CLINIC | Facility: CLINIC | Age: 80
End: 2025-02-18
Payer: MEDICARE

## 2025-02-18 DIAGNOSIS — R73.03 PREDIABETES: Chronic | ICD-10-CM

## 2025-02-18 DIAGNOSIS — E78.5 HYPERLIPIDEMIA, UNSPECIFIED HYPERLIPIDEMIA TYPE: Chronic | ICD-10-CM

## 2025-02-18 DIAGNOSIS — E55.9 VITAMIN D DEFICIENCY: ICD-10-CM

## 2025-02-18 DIAGNOSIS — K21.9 GASTROESOPHAGEAL REFLUX DISEASE WITHOUT ESOPHAGITIS: ICD-10-CM

## 2025-02-18 DIAGNOSIS — E03.9 HYPOTHYROIDISM, UNSPECIFIED TYPE: Chronic | ICD-10-CM

## 2025-02-18 PROCEDURE — 83036 HEMOGLOBIN GLYCOSYLATED A1C: CPT | Performed by: NURSE PRACTITIONER

## 2025-02-18 PROCEDURE — 82306 VITAMIN D 25 HYDROXY: CPT | Performed by: NURSE PRACTITIONER

## 2025-02-18 PROCEDURE — 84443 ASSAY THYROID STIM HORMONE: CPT | Performed by: NURSE PRACTITIONER

## 2025-02-18 PROCEDURE — 85025 COMPLETE CBC W/AUTO DIFF WBC: CPT | Performed by: NURSE PRACTITIONER

## 2025-02-18 PROCEDURE — 82607 VITAMIN B-12: CPT | Performed by: NURSE PRACTITIONER

## 2025-02-18 PROCEDURE — 84439 ASSAY OF FREE THYROXINE: CPT | Performed by: NURSE PRACTITIONER

## 2025-02-18 PROCEDURE — 80061 LIPID PANEL: CPT | Performed by: NURSE PRACTITIONER

## 2025-02-18 PROCEDURE — 36415 COLL VENOUS BLD VENIPUNCTURE: CPT | Performed by: NURSE PRACTITIONER

## 2025-02-18 PROCEDURE — 80053 COMPREHEN METABOLIC PANEL: CPT | Performed by: NURSE PRACTITIONER

## 2025-02-19 LAB
25(OH)D3 SERPL-MCNC: 35.9 NG/ML (ref 30–100)
ALBUMIN SERPL-MCNC: 4.2 G/DL (ref 3.5–5.2)
ALBUMIN/GLOB SERPL: 1.1 G/DL
ALP SERPL-CCNC: 134 U/L (ref 39–117)
ALT SERPL W P-5'-P-CCNC: 30 U/L (ref 1–41)
ANION GAP SERPL CALCULATED.3IONS-SCNC: 13.7 MMOL/L (ref 5–15)
AST SERPL-CCNC: 30 U/L (ref 1–40)
BASOPHILS # BLD AUTO: 0.05 10*3/MM3 (ref 0–0.2)
BASOPHILS NFR BLD AUTO: 0.8 % (ref 0–1.5)
BILIRUB SERPL-MCNC: 1 MG/DL (ref 0–1.2)
BUN SERPL-MCNC: 17 MG/DL (ref 8–23)
BUN/CREAT SERPL: 13.6 (ref 7–25)
CALCIUM SPEC-SCNC: 9.7 MG/DL (ref 8.6–10.5)
CHLORIDE SERPL-SCNC: 104 MMOL/L (ref 98–107)
CHOLEST SERPL-MCNC: 132 MG/DL (ref 0–200)
CO2 SERPL-SCNC: 24.3 MMOL/L (ref 22–29)
CREAT SERPL-MCNC: 1.25 MG/DL (ref 0.76–1.27)
DEPRECATED RDW RBC AUTO: 42.9 FL (ref 37–54)
EGFRCR SERPLBLD CKD-EPI 2021: 58.9 ML/MIN/1.73
EOSINOPHIL # BLD AUTO: 0.25 10*3/MM3 (ref 0–0.4)
EOSINOPHIL NFR BLD AUTO: 4 % (ref 0.3–6.2)
ERYTHROCYTE [DISTWIDTH] IN BLOOD BY AUTOMATED COUNT: 13 % (ref 12.3–15.4)
GLOBULIN UR ELPH-MCNC: 4 GM/DL
GLUCOSE SERPL-MCNC: 113 MG/DL (ref 65–99)
HBA1C MFR BLD: 6.3 % (ref 4.8–5.6)
HCT VFR BLD AUTO: 41.9 % (ref 37.5–51)
HDLC SERPL-MCNC: 40 MG/DL (ref 40–60)
HGB BLD-MCNC: 13.8 G/DL (ref 13–17.7)
IMM GRANULOCYTES # BLD AUTO: 0.02 10*3/MM3 (ref 0–0.05)
IMM GRANULOCYTES NFR BLD AUTO: 0.3 % (ref 0–0.5)
LDLC SERPL CALC-MCNC: 74 MG/DL (ref 0–100)
LDLC/HDLC SERPL: 1.84 {RATIO}
LYMPHOCYTES # BLD AUTO: 0.78 10*3/MM3 (ref 0.7–3.1)
LYMPHOCYTES NFR BLD AUTO: 12.4 % (ref 19.6–45.3)
MCH RBC QN AUTO: 29.7 PG (ref 26.6–33)
MCHC RBC AUTO-ENTMCNC: 32.9 G/DL (ref 31.5–35.7)
MCV RBC AUTO: 90.3 FL (ref 79–97)
MONOCYTES # BLD AUTO: 0.72 10*3/MM3 (ref 0.1–0.9)
MONOCYTES NFR BLD AUTO: 11.4 % (ref 5–12)
NEUTROPHILS NFR BLD AUTO: 4.47 10*3/MM3 (ref 1.7–7)
NEUTROPHILS NFR BLD AUTO: 71.1 % (ref 42.7–76)
NRBC BLD AUTO-RTO: 0 /100 WBC (ref 0–0.2)
PLATELET # BLD AUTO: 178 10*3/MM3 (ref 140–450)
PMV BLD AUTO: 11 FL (ref 6–12)
POTASSIUM SERPL-SCNC: 4.1 MMOL/L (ref 3.5–5.2)
PROT SERPL-MCNC: 8.2 G/DL (ref 6–8.5)
RBC # BLD AUTO: 4.64 10*6/MM3 (ref 4.14–5.8)
SODIUM SERPL-SCNC: 142 MMOL/L (ref 136–145)
T4 FREE SERPL-MCNC: 1.9 NG/DL (ref 0.92–1.68)
TRIGL SERPL-MCNC: 92 MG/DL (ref 0–150)
TSH SERPL DL<=0.05 MIU/L-ACNC: 3.42 UIU/ML (ref 0.27–4.2)
VIT B12 BLD-MCNC: 383 PG/ML (ref 211–946)
VLDLC SERPL-MCNC: 18 MG/DL (ref 5–40)
WBC NRBC COR # BLD AUTO: 6.29 10*3/MM3 (ref 3.4–10.8)

## 2025-02-21 ENCOUNTER — OFFICE VISIT (OUTPATIENT)
Dept: FAMILY MEDICINE CLINIC | Facility: CLINIC | Age: 80
End: 2025-02-21
Payer: MEDICARE

## 2025-02-21 DIAGNOSIS — M25.50 ARTHRALGIA, UNSPECIFIED JOINT: Chronic | ICD-10-CM

## 2025-02-21 DIAGNOSIS — E55.9 VITAMIN D DEFICIENCY: Chronic | ICD-10-CM

## 2025-02-21 DIAGNOSIS — I63.50 CEREBRAL INFARCTION DUE TO CEREBRAL ARTERY OCCLUSION: Primary | Chronic | ICD-10-CM

## 2025-02-21 DIAGNOSIS — K21.9 GASTROESOPHAGEAL REFLUX DISEASE WITHOUT ESOPHAGITIS: Chronic | ICD-10-CM

## 2025-02-21 DIAGNOSIS — I48.0 PAROXYSMAL ATRIAL FIBRILLATION: Chronic | ICD-10-CM

## 2025-02-21 DIAGNOSIS — R73.03 PREDIABETES: Chronic | ICD-10-CM

## 2025-02-21 DIAGNOSIS — E78.5 DYSLIPIDEMIA: Chronic | ICD-10-CM

## 2025-02-21 DIAGNOSIS — E03.9 HYPOTHYROIDISM, UNSPECIFIED TYPE: Chronic | ICD-10-CM

## 2025-02-21 NOTE — PROGRESS NOTES
History of Present Illness  Twan Mcwilliams is a 79 y.o. male who presents to [facility] pertaining to his HTN, PVD s/p right carotid stent in 2022, paroxysmal atrial fibrillation, GI bleeding, s/p pacemaker .He had an ischemic stroke in 2013, resulting in right side deficit. 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 He underwent right carotid stenting in 2022, which was complicated by GI bleeding. Jaime was on warfarin before this, but it was switched to asa and plavix. Since then, he has been prescribed asa and Plavix with no further bleeding issues.He was seen by Dr. Rodgers, Cardiologist,  for consideration of watchman but decided to not pursue.   His wife who is with him and assists with his history does report he has had several episodes of right lower extremity weakness and spasms which have resulted in several falls over the last few weeks.      Hypertension  Risk factors for coronary artery disease include dyslipidemia and male gender. Current antihypertension treatment includes calcium channel blockers.   Lab Results   Component Value Date    GLUCOSE 113 (H) 02/18/2025    BUN 17 02/18/2025    CREATININE 1.25 02/18/2025     02/18/2025    K 4.1 02/18/2025     02/18/2025    CALCIUM 9.7 02/18/2025    PROTEINTOT 8.2 02/18/2025    ALBUMIN 4.2 02/18/2025    ALT 30 02/18/2025    AST 30 02/18/2025    ALKPHOS 134 (H) 02/18/2025    BILITOT 1.0 02/18/2025    GLOB 4.0 02/18/2025    AGRATIO 1.1 02/18/2025    BCR 13.6 02/18/2025    ANIONGAP 13.7 02/18/2025    EGFR 58.9 (L) 02/18/2025      Atrial Fibrillation  He recently underwent a cardiology evaluation with Dr. Garcia who has ordered an echo.  Past medical history includes atrial fibrillation and hyperlipidemia.  Twan did have a follow-up appointment with Dr. Dane Quevedo, vascular surgeon in December at [facility] with follow-up in 1 year.  This note has been reviewed.    Hyperlipidemia  Risk factors for coronary  artery disease include dyslipidemia, hypertension and male sex.   Lab Results   Component Value Date    CHOL 132 02/18/2025    CHOL 139 11/05/2024     Lab Results   Component Value Date    TRIG 92 02/18/2025    TRIG 70 11/05/2024     Lab Results   Component Value Date    HDL 40 02/18/2025    HDL 51 11/05/2024     Lab Results   Component Value Date    LDL 74 02/18/2025    LDL 74 11/05/2024       Hypothyroidism  His past medical history is significant for atrial fibrillation and hyperlipidemia.   Lab Results     Lab Results   Component Value Date    TSH 3.420 02/18/2025      PreDiabetes  His past medical history is significant for atrial fibrillation and hyperlipidemia.   Lab Results   Component Value Date    HGBA1C 6.30 (H) 11/05/2024     Lab Results   Component Value Date    HGBA1C 6.30 (H) 02/18/2025      The following portions of the patient's history were reviewed and updated as appropriate: allergies, current medications, past family history, past medical history, past social history, past surgical history and problem list.    Review of Systems   Constitutional:  Negative for activity change, appetite change, chills, fatigue and fever.   HENT:  Negative for congestion and sore throat.    Eyes:  Negative for pain, discharge, redness and itching.   Respiratory:  Negative for cough, shortness of breath and wheezing.    Cardiovascular:  Negative for chest pain (Resolved) and palpitations.   Gastrointestinal:  Negative for nausea and vomiting.   Musculoskeletal:  Positive for arthralgias and gait problem.   Skin:  Negative for color change and rash.   Neurological:  Negative for dizziness, syncope, light-headedness and headaches.   Hematological:  Negative for adenopathy.   Psychiatric/Behavioral:  Negative for confusion. The patient is not nervous/anxious.      Vital signs:  /60 (BP Location: Left arm, Patient Position: Sitting, Cuff Size: Adult)   Pulse 75   Temp 98.4 °F (36.9 °C) (Temporal)   Resp 14    "Ht 170.2 cm (67.01\")   Wt 67.1 kg (148 lb)   SpO2 100%   BMI 23.17 kg/m²     Physical Exam  Vitals and nursing note reviewed.   Constitutional:       General: He is not in acute distress.     Appearance: He is well-developed.   HENT:      Head: Normocephalic.      Nose: Nose normal.      Mouth/Throat:      Pharynx: No oropharyngeal exudate.   Eyes:      General:         Right eye: No discharge.         Left eye: No discharge.      Conjunctiva/sclera: Conjunctivae normal.   Cardiovascular:      Rate and Rhythm: Normal rate and regular rhythm.      Heart sounds: Normal heart sounds. No murmur heard.     No friction rub.   Pulmonary:      Effort: Pulmonary effort is normal. No respiratory distress.      Breath sounds: Normal breath sounds. No wheezing or rales.   Musculoskeletal:         General: Tenderness (Right hip) present.      Right shoulder: Decreased range of motion. Decreased strength.      Cervical back: Neck supple.      Right lower leg: No edema.      Left lower leg: No edema.      Right foot: Decreased range of motion. Foot drop present.      Comments: Right upper and lower extremity weakness   Feet:      Right foot:      Skin integrity: No ulcer, blister, skin breakdown, erythema, warmth or fissure.      Toenail Condition: Right toenails are normal.   Skin:     General: Skin is warm and dry.      Capillary Refill: Capillary refill takes less than 2 seconds.      Findings: No erythema or rash.   Neurological:      Mental Status: He is alert and oriented to person, place, and time.      Cranial Nerves: Cranial nerve deficit present.      Motor: Weakness present.      Gait: Gait abnormal.      Comments: Right-sided weakness secondary to CVA.     Psychiatric:         Mood and Affect: Mood and affect normal.         Behavior: Behavior is cooperative.         Thought Content: Thought content normal.      Comments: Communicates with difficulty.  Uses expressions and body language.       Result Review " :  Results for orders placed or performed in visit on 02/18/25   Comprehensive Metabolic Panel    Collection Time: 02/18/25  9:46 AM    Specimen: Arm, Left; Blood   Result Value Ref Range    Glucose 113 (H) 65 - 99 mg/dL    BUN 17 8 - 23 mg/dL    Creatinine 1.25 0.76 - 1.27 mg/dL    Sodium 142 136 - 145 mmol/L    Potassium 4.1 3.5 - 5.2 mmol/L    Chloride 104 98 - 107 mmol/L    CO2 24.3 22.0 - 29.0 mmol/L    Calcium 9.7 8.6 - 10.5 mg/dL    Total Protein 8.2 6.0 - 8.5 g/dL    Albumin 4.2 3.5 - 5.2 g/dL    ALT (SGPT) 30 1 - 41 U/L    AST (SGOT) 30 1 - 40 U/L    Alkaline Phosphatase 134 (H) 39 - 117 U/L    Total Bilirubin 1.0 0.0 - 1.2 mg/dL    Globulin 4.0 gm/dL    A/G Ratio 1.1 g/dL    BUN/Creatinine Ratio 13.6 7.0 - 25.0    Anion Gap 13.7 5.0 - 15.0 mmol/L    eGFR 58.9 (L) >60.0 mL/min/1.73   Lipid Panel    Collection Time: 02/18/25  9:46 AM    Specimen: Arm, Left; Blood   Result Value Ref Range    Total Cholesterol 132 0 - 200 mg/dL    Triglycerides 92 0 - 150 mg/dL    HDL Cholesterol 40 40 - 60 mg/dL    LDL Cholesterol  74 0 - 100 mg/dL    VLDL Cholesterol 18 5 - 40 mg/dL    LDL/HDL Ratio 1.84    TSH    Collection Time: 02/18/25  9:46 AM    Specimen: Arm, Left; Blood   Result Value Ref Range    TSH 3.420 0.270 - 4.200 uIU/mL   Hemoglobin A1c    Collection Time: 02/18/25  9:46 AM    Specimen: Arm, Left; Blood   Result Value Ref Range    Hemoglobin A1C 6.30 (H) 4.80 - 5.60 %   Vitamin D,25-Hydroxy    Collection Time: 02/18/25  9:46 AM    Specimen: Arm, Left; Blood   Result Value Ref Range    25 Hydroxy, Vitamin D 35.9 30.0 - 100.0 ng/ml   Vitamin B12    Collection Time: 02/18/25  9:46 AM    Specimen: Arm, Left; Blood   Result Value Ref Range    Vitamin B-12 383 211 - 946 pg/mL   T4, Free    Collection Time: 02/18/25  9:46 AM    Specimen: Arm, Left; Blood   Result Value Ref Range    Free T4 1.90 (H) 0.92 - 1.68 ng/dL   CBC Auto Differential    Collection Time: 02/18/25  9:46 AM    Specimen: Arm, Left; Blood   Result  Value Ref Range    WBC 6.29 3.40 - 10.80 10*3/mm3    RBC 4.64 4.14 - 5.80 10*6/mm3    Hemoglobin 13.8 13.0 - 17.7 g/dL    Hematocrit 41.9 37.5 - 51.0 %    MCV 90.3 79.0 - 97.0 fL    MCH 29.7 26.6 - 33.0 pg    MCHC 32.9 31.5 - 35.7 g/dL    RDW 13.0 12.3 - 15.4 %    RDW-SD 42.9 37.0 - 54.0 fl    MPV 11.0 6.0 - 12.0 fL    Platelets 178 140 - 450 10*3/mm3    Neutrophil % 71.1 42.7 - 76.0 %    Lymphocyte % 12.4 (L) 19.6 - 45.3 %    Monocyte % 11.4 5.0 - 12.0 %    Eosinophil % 4.0 0.3 - 6.2 %    Basophil % 0.8 0.0 - 1.5 %    Immature Grans % 0.3 0.0 - 0.5 %    Neutrophils, Absolute 4.47 1.70 - 7.00 10*3/mm3    Lymphocytes, Absolute 0.78 0.70 - 3.10 10*3/mm3    Monocytes, Absolute 0.72 0.10 - 0.90 10*3/mm3    Eosinophils, Absolute 0.25 0.00 - 0.40 10*3/mm3    Basophils, Absolute 0.05 0.00 - 0.20 10*3/mm3    Immature Grans, Absolute 0.02 0.00 - 0.05 10*3/mm3    nRBC 0.0 0.0 - 0.2 /100 WBC     Assessment & Plan     Diagnoses and all orders for this visit:    1. Cerebral infarction due to cerebral artery occlusion (Primary)  Comments:  With right-sided weakness.  Referral to physical therapy for evaluation and treatment  Orders:  -     Ambulatory Referral to Physical Therapy for Evaluation & Treatment    2. Paroxysmal atrial fibrillation  Comments:  Continue metoprolol, clopidogrel and aspirin    3. Hypothyroidism, unspecified type  Comments:  He will continue levothyroxine 50 mcg  Orders:  -     levothyroxine (SYNTHROID, LEVOTHROID) 50 MCG tablet; Take 1 tablet by mouth Every Morning.  Dispense: 90 tablet; Refill: 0  -     TSH; Future  -     T4, Free; Future    4. Dyslipidemia  Comments:  Continue Atorvastatin  Orders:  -     Lipid Panel; Future    5. Prediabetes  Comments:  Continue lifestyle modifications.  May consider adding a SGLT2  Orders:  -     Lipid Panel; Future  -     Hemoglobin A1c; Future  -     Vitamin B12; Future  -     Comprehensive Metabolic Panel; Future    6. Gastroesophageal reflux disease without  esophagitis  Comments:  Stable.  Continue pantoprazole  Orders:  -     pantoprazole (PROTONIX) 40 MG EC tablet; Take 1 tablet by mouth Every Morning.  Dispense: 90 tablet; Refill: 0  -     CBC & Differential; Future  -     Vitamin B12; Future    7. Vitamin D deficiency  Comments:  Weekly vitamin D  Orders:  -     Vitamin D,25-Hydroxy; Future    8. Arthralgia, unspecified joint  Comments:  Continue acetaminophen.  Avoidance of NSAIDs secondary to Clopidogrel and aspirin.  Physical therapy referral placed  Orders:  -     acetaminophen (TYLENOL) 650 MG 8 hr tablet; 1 to 2 tablets every 12 hours if needed for pain.  Maximum dose 3 tablets in 1 day  Dispense: 100 tablet; Refill: 1  -     Sedimentation Rate; Future  -     C-reactive Protein; Future    Follow Up In May  Findings and recommendations discussed with Twan and his wife. Reviewed test results. Lifestyle modifications reinforced including nutrition and activity recommendations.  He will follow up in May sooner if problems/concerns occur.  Twan was given instructions and counseling regarding his condition or for health maintenance advice. Please see specific information pulled into the AVS if appropriate.    This document has been electronically signed by:

## 2025-02-22 VITALS
DIASTOLIC BLOOD PRESSURE: 60 MMHG | SYSTOLIC BLOOD PRESSURE: 120 MMHG | RESPIRATION RATE: 14 BRPM | HEART RATE: 75 BPM | BODY MASS INDEX: 23.23 KG/M2 | OXYGEN SATURATION: 100 % | HEIGHT: 67 IN | TEMPERATURE: 98.4 F | WEIGHT: 148 LBS

## 2025-02-22 RX ORDER — SENNOSIDES 8.6 MG
CAPSULE ORAL
Qty: 100 TABLET | Refills: 1 | Status: SHIPPED | OUTPATIENT
Start: 2025-02-22

## 2025-02-22 RX ORDER — PANTOPRAZOLE SODIUM 40 MG/1
40 TABLET, DELAYED RELEASE ORAL EVERY MORNING
Qty: 90 TABLET | Refills: 0 | Status: SHIPPED | OUTPATIENT
Start: 2025-02-22

## 2025-02-22 RX ORDER — LEVOTHYROXINE SODIUM 50 UG/1
50 TABLET ORAL EVERY MORNING
Qty: 90 TABLET | Refills: 0 | Status: SHIPPED | OUTPATIENT
Start: 2025-02-22

## 2025-04-02 ENCOUNTER — OFFICE VISIT (OUTPATIENT)
Dept: CARDIOLOGY | Facility: CLINIC | Age: 80
End: 2025-04-02
Payer: MEDICARE

## 2025-04-02 VITALS
SYSTOLIC BLOOD PRESSURE: 128 MMHG | HEART RATE: 60 BPM | BODY MASS INDEX: 23.23 KG/M2 | HEIGHT: 67 IN | WEIGHT: 148 LBS | OXYGEN SATURATION: 98 % | DIASTOLIC BLOOD PRESSURE: 72 MMHG

## 2025-04-02 DIAGNOSIS — I47.29 NSVT (NONSUSTAINED VENTRICULAR TACHYCARDIA): ICD-10-CM

## 2025-04-02 DIAGNOSIS — Z95.0 CARDIAC PACEMAKER IN SITU: Primary | ICD-10-CM

## 2025-04-02 DIAGNOSIS — I48.0 PAROXYSMAL ATRIAL FIBRILLATION: ICD-10-CM

## 2025-04-02 DIAGNOSIS — I77.9 BILATERAL CAROTID ARTERY DISEASE, UNSPECIFIED TYPE: ICD-10-CM

## 2025-04-02 DIAGNOSIS — E78.5 HYPERLIPIDEMIA, UNSPECIFIED HYPERLIPIDEMIA TYPE: ICD-10-CM

## 2025-04-02 NOTE — PROGRESS NOTES
Lesli Blanca APRN  No ref. provider found    Twan Mcwilliams  1945 11/01/2024    Subjective     Twan Mcwilliams is a 79 y.o. male who presents today to [facility] for Follow-up (3 Month Follow up/) and Med Management (Verbal, no changes /).    History of Present Illness:  81-year-old male with history of paroxysmal atrial fibrillation not on anticoagulation due to history of GI bleeding, status post pacemaker, peripheral vascular disease with right carotid stenting in 2022, comes in today to establish care.    As per chart review patient had an ischemic stroke in 2013 leading to a right side defect.  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  He underwent stenting of the right carotid artery in 2022 which was complicated by GI bleeding leading to a prolonged hospitalization of around 65 days.  Patient was previously on warfarin but was switched to aspirin and Plavix given the gastrointestinal bleeding.  He has not had any further bleeding.  Given this episode of bleeding he was found not to be a candidate for systemic anticoagulation and was offered Watchman device.  But patient does not want to pursue any invasive procedures at this point and just wants medical management.  He is pacemaker dependent and when he got his device checked on June 13, 2024 there was 1 year battery left.     Patient reports recently about 1 week ago he had an episode of chest pain for which he went to the emergency room room in [place].  Per report all the blood work and the imaging results were unremarkable patient was offered to stay overnight for monitoring but he wanted to be discharged.  Ambulates with a cane and is able to walk about 100 feet there is no new worsening exertional chest pain or shortness of breath. .  Just had this 1 episode while he was sitting in his chair about 1 week ago for which she went to the ED.  Denies any episodes of  significant palpitations dizziness lightheadedness or episodes of syncope.    Last visit 12/12/2024:  Patient is accompanied with his wife.  Overall patient reports that he has not had any more episodes of chest pain.  He denies having any exertional chest pain or shortness of breath.  The pacemaker interrogation showed 1 episode of supraventricular tachycardia and 56 episodes of NSVT with longest episode lasting for 13 beats with a peak heart rate of 220 bpm.  Echocardiogram done on 11/14/2024 showing ejection fraction of 60% with no significant valvular abnormalities.  He denies noticing any palpitations dizziness lightheadedness or episodes of passing out.  Patient did have an episode of fall at home while using the restroom in which he hurt his right leg.    Today's visit 4/2/2025.  Recent pacemaker interrogation showing 11 months of battery life left.  No more further episodes of NSVT or SVT.  No new episodes of A-fib noted on the interrogation.  Patient denies having any new chest pain shortness of breath or leg swelling.  Denies any further episodes of falls.      No Known Allergies:    Current Outpatient Medications:     acetaminophen (TYLENOL) 650 MG 8 hr tablet, 1 to 2 tablets every 12 hours if needed for pain.  Maximum dose 3 tablets in 1 day, Disp: 100 tablet, Rfl: 1    amLODIPine (NORVASC) 2.5 MG tablet, Take 1 tablet by mouth Daily., Disp: 90 tablet, Rfl: 3    aspirin 81 MG EC tablet, Take 1 tablet by mouth Daily., Disp: 90 tablet, Rfl: 3    atorvastatin (LIPITOR) 80 MG tablet, Take 1 tablet by mouth Daily., Disp: 90 tablet, Rfl: 3    clopidogrel (PLAVIX) 75 MG tablet, Take 1 tablet by mouth Every Morning., Disp: 90 tablet, Rfl: 3    levothyroxine (SYNTHROID, LEVOTHROID) 50 MCG tablet, Take 1 tablet by mouth Every Morning., Disp: 90 tablet, Rfl: 0    metoprolol succinate XL (Toprol XL) 50 MG 24 hr tablet, Take 1 tablet by mouth Daily., Disp: 90 tablet, Rfl: 3    nitroglycerin (Nitrostat) 0.3 MG SL  "tablet, Place 1 tablet under the tongue Every 5 (Five) Minutes As Needed for Chest Pain. Take no more than 3 doses in 15 minutes., Disp: 20 tablet, Rfl: 6    pantoprazole (PROTONIX) 40 MG EC tablet, Take 1 tablet by mouth Every Morning., Disp: 90 tablet, Rfl: 0    vitamin D (ERGOCALCIFEROL) 1.25 MG (79830 UT) capsule capsule, Take 1 capsule by mouth 1 (One) Time Per Week., Disp: , Rfl:     Past Medical History:   Diagnosis Date    Coronary artery disease     H/O: GI bleed     History of heart artery stent     Hyperlipidemia     Myocardial infarction     Pacemaker     Stroke      Past Surgical History:   Procedure Laterality Date    CORONARY STENT PLACEMENT      PACEMAKER IMPLANTATION       History reviewed. No pertinent family history.  Social History     Tobacco Use    Smoking status: Former     Current packs/day: 0.00     Average packs/day: 1 pack/day for 52.0 years (52.0 ttl pk-yrs)     Types: Cigarettes     Start date:      Quit date:      Years since quittin.2     Passive exposure: Never    Smokeless tobacco: Never   Vaping Use    Vaping status: Never Used   Substance Use Topics    Alcohol use: Never    Drug use: Never       Objective   Blood pressure 128/72, pulse 60, height 170.2 cm (67.01\"), weight 67.1 kg (148 lb), SpO2 98%.  Body mass index is 23.17 kg/m².    Constitutional:       Appearance: Not in distress.   Pulmonary:      Breath sounds: Normal breath sounds.   Cardiovascular:      Normal rate. Regular rhythm. Normal S1. Normal S2.       Murmurs: There is no murmur.   Edema:     Peripheral edema absent.   Skin:     General: Skin is warm.           DATA:   Results for orders placed during the hospital encounter of 24    Adult Transthoracic Echo Complete W/ Cont if Necessary Per Protocol    Interpretation Summary    Left ventricular systolic function is normal. Estimated left ventricular EF = 60%    The left ventricular cavity is small in size.    Left ventricular wall thickness is " "consistent with moderate concentric hypertrophy.    Left ventricular diastolic function is consistent with (grade I) impaired relaxation.    Estimated right ventricular systolic pressure from tricuspid regurgitation is normal (<35 mmHg).    Normal right ventricular cavity size and systolic function noted.    No significant valvular abnormalities are seen    There is no evidence of pericardial effusion.    No prior studies available for comparison          No results found for: \"BNP\"  No results found for: \"PSA\"   Lab Results   Component Value Date    MG 2.4 11/05/2024     No results found for: \"INR\"  No results found for: \"CKTOTAL\"  Lab Results   Component Value Date    CHOL 132 02/18/2025    CHOL 139 11/05/2024     Lab Results   Component Value Date    TRIG 92 02/18/2025    TRIG 70 11/05/2024     Lab Results   Component Value Date    HDL 40 02/18/2025    HDL 51 11/05/2024     Lab Results   Component Value Date    LDL 74 02/18/2025    LDL 74 11/05/2024     No components found for: \"A1C\"      Lab Results   Component Value Date    TSH 3.420 02/18/2025             Invalid input(s): \"LABALBU\", \"PROT\"        Results review: During today's encounter, all relevant clinical data has been reviewed.      Procedures    Assessment & Plan    Diagnosis Plan   1. Cardiac pacemaker in situ        2. Paroxysmal atrial fibrillation        3. Hyperlipidemia, unspecified hyperlipidemia type        4. Bilateral carotid artery disease, unspecified type        5. NSVT (nonsustained ventricular tachycardia)                Recommendations  No orders of the defined types were placed in this encounter.       For his history of atrial fibrillation with [id number] score of 6, age, hypertension, peripheral vascular disease and prior stroke.  Patient is currently only on DAPT with aspirin and Plavix.  Patient and wife understand that DAPT do not negate the risk for stroke in setting of atrial fibrillation.  Patient and wife continue to decline " anticoagulation with Eliquis.  He has been previously deemed at high risk of bleeding due to high risk of falling and prior GI bleeding so is not a candidate for anticoagulation.   For his history of carotid artery stenosis, we will continue him on DAPT and Lipitor.  Continue Lipitor 80 mg p.o. once daily.  Goal of LDL is less than 70.  Last lipid panel showing LDL of 74 which is reasonable.   For his history of potential sick sinus syndrome, he has a permanent pacemaker.   Prior interrogation with SVT and NSVT episodes started on Toprol-XL.  Recent interrogation without any further episodes of ectopy or A-fib.  No current dose of metoprolol..   Will continue medical management of possible coronary artery disease.  Will continue DAPT and statin at this time.       New Medications:   No orders of the defined types were placed in this encounter.      Discontinued Medications:   There are no discontinued medications.       Return in about 6 months (around 10/2/2025).      Thank you for allowing me to participate in the care of Twan Mcwilliams. Feel free to contact me directly with any further questions or concerns.      This document has been electronically signed by Mariana Garcia MD   April 2, 2025 09:51 EDT    Dictated Utilizing Dragon Dictation: Part of this note may be an electronic transcription/translation of spoken language to printed text using the Dragon Dictation System.

## 2025-05-08 ENCOUNTER — LAB (OUTPATIENT)
Dept: FAMILY MEDICINE CLINIC | Facility: CLINIC | Age: 80
End: 2025-05-08
Payer: MEDICARE

## 2025-05-08 DIAGNOSIS — R73.03 PREDIABETES: Chronic | ICD-10-CM

## 2025-05-08 DIAGNOSIS — K21.9 GASTROESOPHAGEAL REFLUX DISEASE WITHOUT ESOPHAGITIS: ICD-10-CM

## 2025-05-08 DIAGNOSIS — E55.9 VITAMIN D DEFICIENCY: Chronic | ICD-10-CM

## 2025-05-08 DIAGNOSIS — E78.5 DYSLIPIDEMIA: Chronic | ICD-10-CM

## 2025-05-08 DIAGNOSIS — E03.9 HYPOTHYROIDISM, UNSPECIFIED TYPE: Chronic | ICD-10-CM

## 2025-05-08 DIAGNOSIS — M25.50 ARTHRALGIA, UNSPECIFIED JOINT: Chronic | ICD-10-CM

## 2025-05-08 PROCEDURE — 86140 C-REACTIVE PROTEIN: CPT | Performed by: NURSE PRACTITIONER

## 2025-05-08 PROCEDURE — 80053 COMPREHEN METABOLIC PANEL: CPT | Performed by: NURSE PRACTITIONER

## 2025-05-08 PROCEDURE — 36415 COLL VENOUS BLD VENIPUNCTURE: CPT | Performed by: NURSE PRACTITIONER

## 2025-05-08 PROCEDURE — 84443 ASSAY THYROID STIM HORMONE: CPT | Performed by: NURSE PRACTITIONER

## 2025-05-08 PROCEDURE — 85025 COMPLETE CBC W/AUTO DIFF WBC: CPT | Performed by: NURSE PRACTITIONER

## 2025-05-08 PROCEDURE — 83036 HEMOGLOBIN GLYCOSYLATED A1C: CPT | Performed by: NURSE PRACTITIONER

## 2025-05-08 PROCEDURE — 82306 VITAMIN D 25 HYDROXY: CPT | Performed by: NURSE PRACTITIONER

## 2025-05-08 PROCEDURE — 82607 VITAMIN B-12: CPT | Performed by: NURSE PRACTITIONER

## 2025-05-08 PROCEDURE — 85652 RBC SED RATE AUTOMATED: CPT | Performed by: NURSE PRACTITIONER

## 2025-05-08 PROCEDURE — 84439 ASSAY OF FREE THYROXINE: CPT | Performed by: NURSE PRACTITIONER

## 2025-05-08 PROCEDURE — 80061 LIPID PANEL: CPT | Performed by: NURSE PRACTITIONER

## 2025-05-09 LAB
25(OH)D3 SERPL-MCNC: 53 NG/ML (ref 30–100)
ALBUMIN SERPL-MCNC: 4.3 G/DL (ref 3.5–5.2)
ALBUMIN/GLOB SERPL: 1.5 G/DL
ALP SERPL-CCNC: 132 U/L (ref 39–117)
ALT SERPL W P-5'-P-CCNC: 20 U/L (ref 1–41)
ANION GAP SERPL CALCULATED.3IONS-SCNC: 14.6 MMOL/L (ref 5–15)
AST SERPL-CCNC: 24 U/L (ref 1–40)
BASOPHILS # BLD AUTO: 0.05 10*3/MM3 (ref 0–0.2)
BASOPHILS NFR BLD AUTO: 0.5 % (ref 0–1.5)
BILIRUB SERPL-MCNC: 0.6 MG/DL (ref 0–1.2)
BUN SERPL-MCNC: 24 MG/DL (ref 8–23)
BUN/CREAT SERPL: 22 (ref 7–25)
CALCIUM SPEC-SCNC: 9.3 MG/DL (ref 8.6–10.5)
CHLORIDE SERPL-SCNC: 107 MMOL/L (ref 98–107)
CHOLEST SERPL-MCNC: 127 MG/DL (ref 0–200)
CO2 SERPL-SCNC: 22.4 MMOL/L (ref 22–29)
CREAT SERPL-MCNC: 1.09 MG/DL (ref 0.76–1.27)
CRP SERPL-MCNC: <0.3 MG/DL (ref 0–0.5)
DEPRECATED RDW RBC AUTO: 43.5 FL (ref 37–54)
EGFRCR SERPLBLD CKD-EPI 2021: 69.5 ML/MIN/1.73
EOSINOPHIL # BLD AUTO: 0.35 10*3/MM3 (ref 0–0.4)
EOSINOPHIL NFR BLD AUTO: 3.6 % (ref 0.3–6.2)
ERYTHROCYTE [DISTWIDTH] IN BLOOD BY AUTOMATED COUNT: 13.1 % (ref 12.3–15.4)
ERYTHROCYTE [SEDIMENTATION RATE] IN BLOOD: 7 MM/HR (ref 0–20)
GLOBULIN UR ELPH-MCNC: 2.9 GM/DL
GLUCOSE SERPL-MCNC: 102 MG/DL (ref 65–99)
HBA1C MFR BLD: 6 % (ref 4.8–5.6)
HCT VFR BLD AUTO: 38.5 % (ref 37.5–51)
HDLC SERPL-MCNC: 41 MG/DL (ref 40–60)
HGB BLD-MCNC: 12.9 G/DL (ref 13–17.7)
IMM GRANULOCYTES # BLD AUTO: 0.04 10*3/MM3 (ref 0–0.05)
IMM GRANULOCYTES NFR BLD AUTO: 0.4 % (ref 0–0.5)
LDLC SERPL CALC-MCNC: 65 MG/DL (ref 0–100)
LDLC/HDLC SERPL: 1.53 {RATIO}
LYMPHOCYTES # BLD AUTO: 1.34 10*3/MM3 (ref 0.7–3.1)
LYMPHOCYTES NFR BLD AUTO: 13.9 % (ref 19.6–45.3)
MCH RBC QN AUTO: 30.7 PG (ref 26.6–33)
MCHC RBC AUTO-ENTMCNC: 33.5 G/DL (ref 31.5–35.7)
MCV RBC AUTO: 91.7 FL (ref 79–97)
MONOCYTES # BLD AUTO: 1.04 10*3/MM3 (ref 0.1–0.9)
MONOCYTES NFR BLD AUTO: 10.8 % (ref 5–12)
NEUTROPHILS NFR BLD AUTO: 6.85 10*3/MM3 (ref 1.7–7)
NEUTROPHILS NFR BLD AUTO: 70.8 % (ref 42.7–76)
NRBC BLD AUTO-RTO: 0 /100 WBC (ref 0–0.2)
PLATELET # BLD AUTO: 188 10*3/MM3 (ref 140–450)
PMV BLD AUTO: 11 FL (ref 6–12)
POTASSIUM SERPL-SCNC: 3.7 MMOL/L (ref 3.5–5.2)
PROT SERPL-MCNC: 7.2 G/DL (ref 6–8.5)
RBC # BLD AUTO: 4.2 10*6/MM3 (ref 4.14–5.8)
SODIUM SERPL-SCNC: 144 MMOL/L (ref 136–145)
T4 FREE SERPL-MCNC: 1.86 NG/DL (ref 0.92–1.68)
TRIGL SERPL-MCNC: 117 MG/DL (ref 0–150)
TSH SERPL DL<=0.05 MIU/L-ACNC: 3.25 UIU/ML (ref 0.27–4.2)
VIT B12 BLD-MCNC: 403 PG/ML (ref 211–946)
VLDLC SERPL-MCNC: 21 MG/DL (ref 5–40)
WBC NRBC COR # BLD AUTO: 9.67 10*3/MM3 (ref 3.4–10.8)

## 2025-05-23 ENCOUNTER — OFFICE VISIT (OUTPATIENT)
Dept: FAMILY MEDICINE CLINIC | Facility: CLINIC | Age: 80
End: 2025-05-23
Payer: MEDICARE

## 2025-05-23 VITALS
BODY MASS INDEX: 24.33 KG/M2 | HEART RATE: 71 BPM | SYSTOLIC BLOOD PRESSURE: 120 MMHG | DIASTOLIC BLOOD PRESSURE: 60 MMHG | HEIGHT: 67 IN | TEMPERATURE: 96.9 F | RESPIRATION RATE: 14 BRPM | OXYGEN SATURATION: 98 % | WEIGHT: 155 LBS

## 2025-05-23 DIAGNOSIS — M25.50 ARTHRALGIA, UNSPECIFIED JOINT: Chronic | ICD-10-CM

## 2025-05-23 DIAGNOSIS — E03.9 HYPOTHYROIDISM, UNSPECIFIED TYPE: Primary | Chronic | ICD-10-CM

## 2025-05-23 DIAGNOSIS — I48.0 PAROXYSMAL ATRIAL FIBRILLATION: Chronic | ICD-10-CM

## 2025-05-23 DIAGNOSIS — E78.5 DYSLIPIDEMIA: Chronic | ICD-10-CM

## 2025-05-23 DIAGNOSIS — E55.9 VITAMIN D DEFICIENCY: ICD-10-CM

## 2025-05-23 DIAGNOSIS — R73.03 PREDIABETES: Chronic | ICD-10-CM

## 2025-05-23 DIAGNOSIS — K21.9 GASTROESOPHAGEAL REFLUX DISEASE WITHOUT ESOPHAGITIS: Chronic | ICD-10-CM

## 2025-05-23 RX ORDER — SENNOSIDES 8.6 MG
CAPSULE ORAL
Qty: 100 TABLET | Refills: 1 | Status: SHIPPED | OUTPATIENT
Start: 2025-05-23

## 2025-05-23 RX ORDER — LEVOTHYROXINE SODIUM 50 UG/1
50 TABLET ORAL EVERY MORNING
Qty: 90 TABLET | Refills: 0 | Status: SHIPPED | OUTPATIENT
Start: 2025-05-23

## 2025-05-23 RX ORDER — ERGOCALCIFEROL 1.25 MG/1
50000 CAPSULE, LIQUID FILLED ORAL WEEKLY
Qty: 12 CAPSULE | Refills: 0 | Status: SHIPPED | OUTPATIENT
Start: 2025-05-23

## 2025-05-23 RX ORDER — PANTOPRAZOLE SODIUM 40 MG/1
40 TABLET, DELAYED RELEASE ORAL EVERY MORNING
Qty: 90 TABLET | Refills: 0 | Status: SHIPPED | OUTPATIENT
Start: 2025-05-23

## 2025-05-23 NOTE — PROGRESS NOTES
History of Present Illness  Twan Mcwilliams is a 79 y.o. male who presents to [facility] pertaining to his HTN, PVD s/p right carotid stent in 2022, paroxysmal atrial fibrillation, GI bleeding, s/p pacemaker .He had an ischemic stroke in 2013, resulting in right side deficit. 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 He underwent right carotid stenting in 2022, which was complicated by GI bleeding. Jaime was on warfarin before this, but it was switched to asa and plavix. Since then, he has been prescribed asa and Plavix with no further bleeding issues.He was seen by Dr. Rodgers, Cardiologist,  for consideration of watchman but decided to not pursue.     Hypertension  Risk factors for coronary artery disease include dyslipidemia and male gender. Current antihypertension treatment includes calcium channel blockers.   Lab Results   Component Value Date    GLUCOSE 102 (H) 05/08/2025    BUN 24 (H) 05/08/2025    CREATININE 1.09 05/08/2025     05/08/2025    K 3.7 05/08/2025     05/08/2025    CALCIUM 9.3 05/08/2025    PROTEINTOT 7.2 05/08/2025    ALBUMIN 4.3 05/08/2025    ALT 20 05/08/2025    AST 24 05/08/2025    ALKPHOS 132 (H) 05/08/2025    BILITOT 0.6 05/08/2025    GLOB 2.9 05/08/2025    AGRATIO 1.5 05/08/2025    BCR 22.0 05/08/2025    ANIONGAP 14.6 05/08/2025    EGFR 69.5 05/08/2025      Atrial Fibrillation  Followed by Dr Garcia with last ECHO  Nov 15,2024 with left ventricular systolic function on 60%.  Past medical history includes atrial fibrillation and hyperlipidemia.  Twan did have a follow-up appointment with Dr. Dane Quevedo, vascular surgeon in December at [facility] with follow-up in 1 year.      Hyperlipidemia  Risk factors for coronary artery disease include dyslipidemia, hypertension and male sex.   Lab Results   Component Value Date    CHOL 127 05/08/2025    CHOL 132 02/18/2025    CHOL 139 11/05/2024     Lab Results   Component Value Date    TRIG 117  "05/08/2025    TRIG 92 02/18/2025    TRIG 70 11/05/2024     Lab Results   Component Value Date    HDL 41 05/08/2025    HDL 40 02/18/2025    HDL 51 11/05/2024     Lab Results   Component Value Date    LDL 65 05/08/2025    LDL 74 02/18/2025    LDL 74 11/05/2024          Hypothyroidism  His past medical history is significant for atrial fibrillation and hyperlipidemia.   Lab Results     Lab Results   Component Value Date    TSH 3.250 05/08/2025       PreDiabetes  His past medical history is significant for atrial fibrillation and hyperlipidemia.     Lab Results   Component Value Date    HGBA1C 6.30 (H) 02/18/2025     Lab Results   Component Value Date    HGBA1C 6.00 (H) 05/08/2025      The following portions of the patient's history were reviewed and updated as appropriate: allergies, current medications, past family history, past medical history, past social history, past surgical history and problem list.    Review of Systems   Constitutional:  Negative for activity change, appetite change, chills, fatigue and fever.   HENT:  Negative for congestion and sore throat.    Eyes:  Negative for pain, discharge, redness and itching.   Respiratory:  Negative for cough, shortness of breath and wheezing.    Cardiovascular:  Negative for chest pain and palpitations.   Gastrointestinal:  Negative for nausea and vomiting.   Musculoskeletal:  Positive for arthralgias and gait problem.   Skin:  Negative for color change and rash.   Neurological:  Negative for dizziness, syncope, light-headedness and headaches.   Hematological:  Negative for adenopathy.   Psychiatric/Behavioral:  Negative for confusion. The patient is not nervous/anxious.      Vital signs:  /60 (BP Location: Left arm, Patient Position: Sitting, Cuff Size: Adult)   Pulse 71   Temp 96.9 °F (36.1 °C) (Temporal)   Resp 14   Ht 170.2 cm (67.01\")   Wt 70.3 kg (155 lb)   SpO2 98%   BMI 24.27 kg/m²     Physical Exam  Vitals and nursing note reviewed. "   Constitutional:       General: He is not in acute distress.     Appearance: He is well-developed.   HENT:      Head: Normocephalic.      Nose: Nose normal.      Mouth/Throat:      Pharynx: No oropharyngeal exudate.   Eyes:      General:         Right eye: No discharge.         Left eye: No discharge.      Conjunctiva/sclera: Conjunctivae normal.   Cardiovascular:      Rate and Rhythm: Normal rate and regular rhythm.      Heart sounds: Normal heart sounds. No murmur heard.     No friction rub.   Pulmonary:      Effort: Pulmonary effort is normal. No respiratory distress.      Breath sounds: Normal breath sounds. No wheezing or rales.   Musculoskeletal:         General: Tenderness (Right hip) present.      Right shoulder: Decreased range of motion. Decreased strength.      Cervical back: Neck supple.      Right lower leg: No edema.      Left lower leg: No edema.      Right foot: Decreased range of motion.      Comments: Right upper and lower extremity weakness   Skin:     General: Skin is warm and dry.      Capillary Refill: Capillary refill takes less than 2 seconds.      Findings: No erythema or rash.   Neurological:      Mental Status: He is alert and oriented to person, place, and time.      Cranial Nerves: Cranial nerve deficit present.      Motor: Weakness present.      Gait: Gait abnormal.      Comments: Right-sided weakness secondary to CVA.     Psychiatric:         Mood and Affect: Mood and affect normal.         Behavior: Behavior is cooperative.         Thought Content: Thought content normal.      Comments: Communicates with difficulty.  Uses expressions and body language.       Result Review :  Results for orders placed or performed in visit on 05/08/25   Lipid Panel    Collection Time: 05/08/25  1:16 PM    Specimen: Arm, Right; Blood   Result Value Ref Range    Total Cholesterol 127 0 - 200 mg/dL    Triglycerides 117 0 - 150 mg/dL    HDL Cholesterol 41 40 - 60 mg/dL    LDL Cholesterol  65 0 - 100 mg/dL     VLDL Cholesterol 21 5 - 40 mg/dL    LDL/HDL Ratio 1.53    TSH    Collection Time: 05/08/25  1:16 PM    Specimen: Arm, Right; Blood   Result Value Ref Range    TSH 3.250 0.270 - 4.200 uIU/mL   Hemoglobin A1c    Collection Time: 05/08/25  1:16 PM    Specimen: Arm, Right; Blood   Result Value Ref Range    Hemoglobin A1C 6.00 (H) 4.80 - 5.60 %   Vitamin D,25-Hydroxy    Collection Time: 05/08/25  1:16 PM    Specimen: Arm, Right; Blood   Result Value Ref Range    25 Hydroxy, Vitamin D 53.0 30.0 - 100.0 ng/ml   Vitamin B12    Collection Time: 05/08/25  1:16 PM    Specimen: Arm, Right; Blood   Result Value Ref Range    Vitamin B-12 403 211 - 946 pg/mL   T4, Free    Collection Time: 05/08/25  1:16 PM    Specimen: Arm, Right; Blood   Result Value Ref Range    Free T4 1.86 (H) 0.92 - 1.68 ng/dL   Comprehensive Metabolic Panel    Collection Time: 05/08/25  1:16 PM    Specimen: Arm, Right; Blood   Result Value Ref Range    Glucose 102 (H) 65 - 99 mg/dL    BUN 24 (H) 8 - 23 mg/dL    Creatinine 1.09 0.76 - 1.27 mg/dL    Sodium 144 136 - 145 mmol/L    Potassium 3.7 3.5 - 5.2 mmol/L    Chloride 107 98 - 107 mmol/L    CO2 22.4 22.0 - 29.0 mmol/L    Calcium 9.3 8.6 - 10.5 mg/dL    Total Protein 7.2 6.0 - 8.5 g/dL    Albumin 4.3 3.5 - 5.2 g/dL    ALT (SGPT) 20 1 - 41 U/L    AST (SGOT) 24 1 - 40 U/L    Alkaline Phosphatase 132 (H) 39 - 117 U/L    Total Bilirubin 0.6 0.0 - 1.2 mg/dL    Globulin 2.9 gm/dL    A/G Ratio 1.5 g/dL    BUN/Creatinine Ratio 22.0 7.0 - 25.0    Anion Gap 14.6 5.0 - 15.0 mmol/L    eGFR 69.5 >60.0 mL/min/1.73   Sedimentation Rate    Collection Time: 05/08/25  1:16 PM    Specimen: Arm, Right; Blood   Result Value Ref Range    Sed Rate 7 0 - 20 mm/hr   C-reactive Protein    Collection Time: 05/08/25  1:16 PM    Specimen: Arm, Right; Blood   Result Value Ref Range    C-Reactive Protein <0.30 0.00 - 0.50 mg/dL   CBC Auto Differential    Collection Time: 05/08/25  1:16 PM    Specimen: Arm, Right; Blood   Result Value  Ref Range    WBC 9.67 3.40 - 10.80 10*3/mm3    RBC 4.20 4.14 - 5.80 10*6/mm3    Hemoglobin 12.9 (L) 13.0 - 17.7 g/dL    Hematocrit 38.5 37.5 - 51.0 %    MCV 91.7 79.0 - 97.0 fL    MCH 30.7 26.6 - 33.0 pg    MCHC 33.5 31.5 - 35.7 g/dL    RDW 13.1 12.3 - 15.4 %    RDW-SD 43.5 37.0 - 54.0 fl    MPV 11.0 6.0 - 12.0 fL    Platelets 188 140 - 450 10*3/mm3    Neutrophil % 70.8 42.7 - 76.0 %    Lymphocyte % 13.9 (L) 19.6 - 45.3 %    Monocyte % 10.8 5.0 - 12.0 %    Eosinophil % 3.6 0.3 - 6.2 %    Basophil % 0.5 0.0 - 1.5 %    Immature Grans % 0.4 0.0 - 0.5 %    Neutrophils, Absolute 6.85 1.70 - 7.00 10*3/mm3    Lymphocytes, Absolute 1.34 0.70 - 3.10 10*3/mm3    Monocytes, Absolute 1.04 (H) 0.10 - 0.90 10*3/mm3    Eosinophils, Absolute 0.35 0.00 - 0.40 10*3/mm3    Basophils, Absolute 0.05 0.00 - 0.20 10*3/mm3    Immature Grans, Absolute 0.04 0.00 - 0.05 10*3/mm3    nRBC 0.0 0.0 - 0.2 /100 WBC     Assessment & Plan     Diagnoses and all orders for this visit:    1. Hypothyroidism, unspecified type (Primary)  Comments:  He will continue levothyroxine 50 mcg  Orders:  -     levothyroxine (SYNTHROID, LEVOTHROID) 50 MCG tablet; Take 1 tablet by mouth Every Morning.  Dispense: 90 tablet; Refill: 0    2. Dyslipidemia  Comments:  Continue atorvastatin 80 mg    3. Arthralgia, unspecified joint  Comments:  Continue acetaminophen.  Avoidance of NSAIDs secondary to Clopidogrel and aspirin.  Currently he is in physical therapy  Orders:  -     acetaminophen (TYLENOL) 650 MG 8 hr tablet; 1 to 2 tablets every 12 hours if needed for pain.  Maximum dose 3 tablets in 1 day  Dispense: 100 tablet; Refill: 1    4. Paroxysmal atrial fibrillation  Comments:  Continue continue metoprolol, Plavix and aspirin    5. Prediabetes  Comments:  Reviewed recent A1c which is stable.    6. Gastroesophageal reflux disease without esophagitis  Comments:  Stable.  Continue pantoprazole  Orders:  -     pantoprazole (PROTONIX) 40 MG EC tablet; Take 1 tablet by mouth  Every Morning.  Dispense: 90 tablet; Refill: 0    7. Vitamin D deficiency  Comments:  Continue vitamin D  Orders:  -     vitamin D (ERGOCALCIFEROL) 1.25 MG (74705 UT) capsule capsule; Take 1 capsule by mouth 1 (One) Time Per Week.  Dispense: 12 capsule; Refill: 0    Follow Up In September  Findings and recommendations discussed with Twan. Reviewed test results. Lifestyle modifications reinforced including nutrition and activity recommendations.  Twan will follow up in September sooner if problems/concerns occur.  Twan was given instructions and counseling regarding his condition or for health maintenance advice. Please see specific information pulled into the AVS if appropriate.    This document has been electronically signed by:

## 2025-06-04 ENCOUNTER — CLINICAL SUPPORT NO REQUIREMENTS (OUTPATIENT)
Dept: CARDIOLOGY | Facility: CLINIC | Age: 80
End: 2025-06-04
Payer: MEDICARE

## 2025-06-04 DIAGNOSIS — Z95.0 CARDIAC PACEMAKER IN SITU: Primary | ICD-10-CM

## 2025-08-22 ENCOUNTER — LAB (OUTPATIENT)
Dept: FAMILY MEDICINE CLINIC | Facility: CLINIC | Age: 80
End: 2025-08-22
Payer: MEDICARE

## 2025-08-22 DIAGNOSIS — E03.9 HYPOTHYROIDISM, UNSPECIFIED TYPE: Chronic | ICD-10-CM

## 2025-08-22 DIAGNOSIS — K21.9 GASTROESOPHAGEAL REFLUX DISEASE WITHOUT ESOPHAGITIS: ICD-10-CM

## 2025-08-22 DIAGNOSIS — E55.9 VITAMIN D DEFICIENCY: ICD-10-CM

## 2025-08-22 DIAGNOSIS — R73.03 PREDIABETES: Chronic | ICD-10-CM

## 2025-08-22 DIAGNOSIS — I48.0 PAROXYSMAL ATRIAL FIBRILLATION: Chronic | ICD-10-CM

## 2025-08-22 DIAGNOSIS — E78.5 DYSLIPIDEMIA: Chronic | ICD-10-CM

## 2025-08-22 LAB
ALBUMIN SERPL-MCNC: 4.5 G/DL (ref 3.5–5.2)
ALBUMIN/GLOB SERPL: 1.4 G/DL
ALP SERPL-CCNC: 122 U/L (ref 39–117)
ALT SERPL W P-5'-P-CCNC: 23 U/L (ref 1–41)
ANION GAP SERPL CALCULATED.3IONS-SCNC: 12.5 MMOL/L (ref 5–15)
AST SERPL-CCNC: 32 U/L (ref 1–40)
BASOPHILS # BLD AUTO: 0.03 10*3/MM3 (ref 0–0.2)
BASOPHILS NFR BLD AUTO: 0.4 % (ref 0–1.5)
BILIRUB SERPL-MCNC: 0.8 MG/DL (ref 0–1.2)
BUN SERPL-MCNC: 19.7 MG/DL (ref 8–23)
BUN/CREAT SERPL: 15.9 (ref 7–25)
CALCIUM SPEC-SCNC: 9.8 MG/DL (ref 8.6–10.5)
CHLORIDE SERPL-SCNC: 105 MMOL/L (ref 98–107)
CHOLEST SERPL-MCNC: 120 MG/DL (ref 0–200)
CO2 SERPL-SCNC: 24.5 MMOL/L (ref 22–29)
CREAT SERPL-MCNC: 1.24 MG/DL (ref 0.76–1.27)
DEPRECATED RDW RBC AUTO: 43.8 FL (ref 37–54)
EGFRCR SERPLBLD CKD-EPI 2021: 59.5 ML/MIN/1.73
EOSINOPHIL # BLD AUTO: 0.27 10*3/MM3 (ref 0–0.4)
EOSINOPHIL NFR BLD AUTO: 3.4 % (ref 0.3–6.2)
ERYTHROCYTE [DISTWIDTH] IN BLOOD BY AUTOMATED COUNT: 12.8 % (ref 12.3–15.4)
GLOBULIN UR ELPH-MCNC: 3.3 GM/DL
GLUCOSE SERPL-MCNC: 58 MG/DL (ref 65–99)
HBA1C MFR BLD: 6.08 % (ref 4.8–5.6)
HCT VFR BLD AUTO: 43 % (ref 37.5–51)
HDLC SERPL-MCNC: 37 MG/DL (ref 40–60)
HGB BLD-MCNC: 13.7 G/DL (ref 13–17.7)
IMM GRANULOCYTES # BLD AUTO: 0.03 10*3/MM3 (ref 0–0.05)
IMM GRANULOCYTES NFR BLD AUTO: 0.4 % (ref 0–0.5)
LDLC SERPL CALC-MCNC: 63 MG/DL (ref 0–100)
LDLC/HDLC SERPL: 1.64 {RATIO}
LYMPHOCYTES # BLD AUTO: 0.99 10*3/MM3 (ref 0.7–3.1)
LYMPHOCYTES NFR BLD AUTO: 12.6 % (ref 19.6–45.3)
MCH RBC QN AUTO: 29.8 PG (ref 26.6–33)
MCHC RBC AUTO-ENTMCNC: 31.9 G/DL (ref 31.5–35.7)
MCV RBC AUTO: 93.5 FL (ref 79–97)
MONOCYTES # BLD AUTO: 0.74 10*3/MM3 (ref 0.1–0.9)
MONOCYTES NFR BLD AUTO: 9.4 % (ref 5–12)
NEUTROPHILS NFR BLD AUTO: 5.79 10*3/MM3 (ref 1.7–7)
NEUTROPHILS NFR BLD AUTO: 73.8 % (ref 42.7–76)
NRBC BLD AUTO-RTO: 0 /100 WBC (ref 0–0.2)
PLATELET # BLD AUTO: 203 10*3/MM3 (ref 140–450)
PMV BLD AUTO: 10.6 FL (ref 6–12)
POTASSIUM SERPL-SCNC: 4.3 MMOL/L (ref 3.5–5.2)
PROT SERPL-MCNC: 7.8 G/DL (ref 6–8.5)
RBC # BLD AUTO: 4.6 10*6/MM3 (ref 4.14–5.8)
SODIUM SERPL-SCNC: 142 MMOL/L (ref 136–145)
T4 FREE SERPL-MCNC: 1.85 NG/DL (ref 0.92–1.68)
TRIGL SERPL-MCNC: 111 MG/DL (ref 0–150)
TSH SERPL DL<=0.05 MIU/L-ACNC: 2.9 UIU/ML (ref 0.27–4.2)
VLDLC SERPL-MCNC: 20 MG/DL (ref 5–40)
WBC NRBC COR # BLD AUTO: 7.85 10*3/MM3 (ref 3.4–10.8)

## 2025-08-22 PROCEDURE — 36415 COLL VENOUS BLD VENIPUNCTURE: CPT

## 2025-08-23 LAB
25(OH)D3 SERPL-MCNC: 69.3 NG/ML (ref 30–100)
VIT B12 BLD-MCNC: 432 PG/ML (ref 211–946)

## 2025-08-26 ENCOUNTER — OFFICE VISIT (OUTPATIENT)
Dept: FAMILY MEDICINE CLINIC | Facility: CLINIC | Age: 80
End: 2025-08-26
Payer: MEDICARE

## 2025-08-26 VITALS
DIASTOLIC BLOOD PRESSURE: 60 MMHG | RESPIRATION RATE: 14 BRPM | OXYGEN SATURATION: 98 % | SYSTOLIC BLOOD PRESSURE: 110 MMHG | BODY MASS INDEX: 23.54 KG/M2 | HEART RATE: 62 BPM | TEMPERATURE: 97.2 F | WEIGHT: 150 LBS | HEIGHT: 67 IN

## 2025-08-26 DIAGNOSIS — E55.9 VITAMIN D DEFICIENCY: Chronic | ICD-10-CM

## 2025-08-26 DIAGNOSIS — K13.79 MOUTH PAIN: ICD-10-CM

## 2025-08-26 DIAGNOSIS — I63.9 CEREBROVASCULAR ACCIDENT (CVA), UNSPECIFIED MECHANISM: Chronic | ICD-10-CM

## 2025-08-26 DIAGNOSIS — K21.9 GASTROESOPHAGEAL REFLUX DISEASE WITHOUT ESOPHAGITIS: Chronic | ICD-10-CM

## 2025-08-26 DIAGNOSIS — E78.5 DYSLIPIDEMIA: Chronic | ICD-10-CM

## 2025-08-26 DIAGNOSIS — M25.50 ARTHRALGIA, UNSPECIFIED JOINT: Chronic | ICD-10-CM

## 2025-08-26 DIAGNOSIS — Z95.828 INTERNAL CAROTID ARTERY STENT PRESENT: Chronic | ICD-10-CM

## 2025-08-26 DIAGNOSIS — E03.9 HYPOTHYROIDISM, UNSPECIFIED TYPE: Chronic | ICD-10-CM

## 2025-08-26 DIAGNOSIS — R73.03 PREDIABETES: Primary | Chronic | ICD-10-CM

## 2025-08-26 DIAGNOSIS — I48.0 PAROXYSMAL ATRIAL FIBRILLATION: Chronic | ICD-10-CM

## 2025-08-26 PROCEDURE — 1159F MED LIST DOCD IN RCRD: CPT | Performed by: NURSE PRACTITIONER

## 2025-08-26 PROCEDURE — 99215 OFFICE O/P EST HI 40 MIN: CPT | Performed by: NURSE PRACTITIONER

## 2025-08-26 PROCEDURE — 1160F RVW MEDS BY RX/DR IN RCRD: CPT | Performed by: NURSE PRACTITIONER

## 2025-08-26 PROCEDURE — 1126F AMNT PAIN NOTED NONE PRSNT: CPT | Performed by: NURSE PRACTITIONER

## 2025-08-26 PROCEDURE — G2211 COMPLEX E/M VISIT ADD ON: HCPCS | Performed by: NURSE PRACTITIONER

## 2025-08-26 RX ORDER — PANTOPRAZOLE SODIUM 40 MG/1
40 TABLET, DELAYED RELEASE ORAL EVERY MORNING
Qty: 90 TABLET | Refills: 0 | Status: SHIPPED | OUTPATIENT
Start: 2025-08-26

## 2025-08-26 RX ORDER — ERGOCALCIFEROL 1.25 MG/1
50000 CAPSULE, LIQUID FILLED ORAL
Qty: 6 CAPSULE | Refills: 0 | Status: SHIPPED | OUTPATIENT
Start: 2025-08-26

## 2025-08-26 RX ORDER — LEVOTHYROXINE SODIUM 50 UG/1
50 TABLET ORAL EVERY MORNING
Qty: 90 TABLET | Refills: 0 | Status: SHIPPED | OUTPATIENT
Start: 2025-08-26

## 2025-08-26 RX ORDER — DIPHENHYDRAMINE HYDROCHLORIDE 25 MG/1
1 CAPSULE, LIQUID FILLED ORAL DAILY
Qty: 1 EACH | Refills: 0 | Status: SHIPPED | OUTPATIENT
Start: 2025-08-26

## 2025-08-26 RX ORDER — ALCOHOL ANTISEPTIC PADS
1 PADS, MEDICATED (EA) TOPICAL DAILY
Qty: 100 EACH | Refills: 3 | Status: SHIPPED | OUTPATIENT
Start: 2025-08-26

## 2025-08-26 RX ORDER — LIDOCAINE HYDROCHLORIDE 20 MG/ML
5 SOLUTION OROPHARYNGEAL AS NEEDED
Qty: 100 ML | Refills: 1 | Status: SHIPPED | OUTPATIENT
Start: 2025-08-26

## 2025-08-27 ENCOUNTER — TELEPHONE (OUTPATIENT)
Dept: FAMILY MEDICINE CLINIC | Facility: CLINIC | Age: 80
End: 2025-08-27
Payer: MEDICARE